# Patient Record
Sex: FEMALE | Race: WHITE | Employment: UNEMPLOYED | URBAN - METROPOLITAN AREA
[De-identification: names, ages, dates, MRNs, and addresses within clinical notes are randomized per-mention and may not be internally consistent; named-entity substitution may affect disease eponyms.]

---

## 2017-01-30 ENCOUNTER — HOSPITAL ENCOUNTER (OUTPATIENT)
Dept: PHYSICAL THERAPY | Age: 37
Discharge: HOME | End: 2017-02-28
Admitting: FAMILY MEDICINE

## 2017-02-15 ENCOUNTER — HOSPITAL ENCOUNTER (OUTPATIENT)
Dept: OTHER | Age: 37
Discharge: HOME | End: 2017-02-15
Attending: FAMILY MEDICINE | Admitting: FAMILY MEDICINE

## 2017-08-26 ENCOUNTER — HOSPITAL ENCOUNTER (OUTPATIENT)
Dept: ULTRASOUND IMAGING | Age: 37
Discharge: HOME | End: 2017-08-26
Attending: FAMILY MEDICINE | Admitting: FAMILY MEDICINE

## 2017-08-26 DIAGNOSIS — R10.11 RUQ PAIN: ICD-10-CM

## 2017-08-26 DIAGNOSIS — R10.11 RIGHT UPPER QUADRANT PAIN: ICD-10-CM

## 2018-08-13 ENCOUNTER — OFFICE VISIT (OUTPATIENT)
Dept: SURGERY | Age: 38
End: 2018-08-13

## 2018-08-13 VITALS
BODY MASS INDEX: 46.78 KG/M2 | HEART RATE: 104 BPM | WEIGHT: 274 LBS | HEIGHT: 64 IN | DIASTOLIC BLOOD PRESSURE: 87 MMHG | SYSTOLIC BLOOD PRESSURE: 139 MMHG

## 2018-08-13 DIAGNOSIS — I10 ESSENTIAL HYPERTENSION: ICD-10-CM

## 2018-08-13 DIAGNOSIS — I65.22 OCCLUSION OF LEFT CAROTID ARTERY: Primary | ICD-10-CM

## 2018-08-13 DIAGNOSIS — Z82.49 FAMILY HISTORY OF ATHEROSCLEROSIS: ICD-10-CM

## 2018-08-13 DIAGNOSIS — E66.01 MORBID OBESITY DUE TO EXCESS CALORIES (HCC): ICD-10-CM

## 2018-08-13 DIAGNOSIS — I65.01 ASYMPTOMATIC STENOSIS OF RIGHT VERTEBRAL ARTERY: ICD-10-CM

## 2018-08-13 DIAGNOSIS — Z72.0 TOBACCO USE: ICD-10-CM

## 2018-08-13 DIAGNOSIS — Z86.73 HISTORY OF TIAS: ICD-10-CM

## 2018-08-13 DIAGNOSIS — E78.5 HYPERLIPIDEMIA, UNSPECIFIED HYPERLIPIDEMIA TYPE: ICD-10-CM

## 2018-08-13 PROCEDURE — 4004F PT TOBACCO SCREEN RCVD TLK: CPT | Performed by: SURGERY

## 2018-08-13 PROCEDURE — G8427 DOCREV CUR MEDS BY ELIG CLIN: HCPCS | Performed by: SURGERY

## 2018-08-13 PROCEDURE — G8599 NO ASA/ANTIPLAT THER USE RNG: HCPCS | Performed by: SURGERY

## 2018-08-13 PROCEDURE — 99214 OFFICE O/P EST MOD 30 MIN: CPT | Performed by: SURGERY

## 2018-08-13 PROCEDURE — G8417 CALC BMI ABV UP PARAM F/U: HCPCS | Performed by: SURGERY

## 2018-08-13 RX ORDER — BUSPIRONE HYDROCHLORIDE 15 MG/1
15 TABLET ORAL NIGHTLY
COMMUNITY

## 2018-08-13 RX ORDER — LOSARTAN POTASSIUM 100 MG/1
100 TABLET ORAL DAILY
COMMUNITY
End: 2019-10-23 | Stop reason: CLARIF

## 2018-08-13 RX ORDER — BUPROPION HYDROCHLORIDE 150 MG/1
150 TABLET, EXTENDED RELEASE ORAL DAILY
COMMUNITY
End: 2021-03-22 | Stop reason: CLARIF

## 2018-08-13 RX ORDER — CYCLOBENZAPRINE HCL 5 MG
5 TABLET ORAL 3 TIMES DAILY PRN
COMMUNITY
End: 2020-07-09 | Stop reason: CLARIF

## 2018-08-13 ASSESSMENT — ENCOUNTER SYMPTOMS
COUGH: 0
SHORTNESS OF BREATH: 1

## 2018-08-13 NOTE — PROGRESS NOTES
side, and 2+ on the left side. Radial pulses are 2+ on the right side, and 2+ on the left side. Femoral pulses are 2+ on the right side, and 2+ on the left side. Popliteal pulses are 2+ on the right side, and 2+ on the left side. Dorsalis pedis pulses are 2+ on the right side, and 2+ on the left side. Posterior tibial pulses are 2+ on the right side, and 2+ on the left side. Pulmonary/Chest: No respiratory distress. She has no decreased breath sounds. She has no wheezes. She has no rales. She exhibits no tenderness. Abdominal: Soft. Normal appearance and bowel sounds are normal. There is no hepatosplenomegaly. There is no tenderness. There is no rebound. No hernia. Hernia confirmed negative in the right inguinal area and confirmed negative in the left inguinal area. Musculoskeletal: Normal range of motion. Right knee: She exhibits no swelling and no deformity. No tenderness found. Left knee: She exhibits no swelling and no deformity. No tenderness found. Lumbar back: Normal. She exhibits no bony tenderness and no pain. Lymphadenopathy:        Right: No inguinal and no supraclavicular adenopathy present. Left: No inguinal and no supraclavicular adenopathy present. Neurological: She is alert and oriented to person, place, and time. She has normal strength. No cranial nerve deficit or sensory deficit. Coordination and gait normal.   Normal neurologic exam  Resolution of clumsy right hand from stroke 2 years ago. Skin: Skin is warm and intact. No bruising and no ecchymosis noted. No cyanosis. Psychiatric: She has a normal mood and affect. Her speech is normal and behavior is normal. Judgment and thought content normal. Cognition and memory are normal.   Nursing note and vitals reviewed. Assessment:       Diagnosis Orders   1. Occlusion of left carotid artery  VL Carotid Bilateral   2.  Asymptomatic stenosis of right vertebral artery  VL Carotid Bilateral   3. Tobacco use  VL Carotid Bilateral   4. Hyperlipidemia, unspecified hyperlipidemia type  VL Carotid Bilateral   5. Essential hypertension  VL Carotid Bilateral   6. Family history of atherosclerosis     7. Morbid obesity due to excess calories ([address]     8.  History of TIAs      12/2015           Plan:      STOP SMOKING IS ESSENTIAL FOR ARTERIAL HEALTH AND PROTECTION (Long discussion about why she must stop smoking!!!!)  Exercise for weight control and cardiovascular health  Carotid artery scan to monitor the status of the right carotid artery  Follow up visit in ~3-4 weeks          Quan Emmanuel MD

## 2018-08-20 ENCOUNTER — HOSPITAL ENCOUNTER (OUTPATIENT)
Dept: NON INVASIVE DIAGNOSTICS | Age: 38
Discharge: HOME | End: 2018-08-20
Attending: NURSE PRACTITIONER | Admitting: NURSE PRACTITIONER

## 2018-08-20 DIAGNOSIS — M54.5 LOW BACK PAIN, UNSPECIFIED BACK PAIN LATERALITY, UNSPECIFIED CHRONICITY, WITH SCIATICA PRESENCE UNSPECIFIED: ICD-10-CM

## 2018-10-08 ENCOUNTER — OFFICE VISIT (OUTPATIENT)
Dept: SURGERY | Age: 38
End: 2018-10-08
Payer: COMMERCIAL

## 2018-10-08 ENCOUNTER — PROCEDURE VISIT (OUTPATIENT)
Dept: SURGERY | Age: 38
End: 2018-10-08
Payer: COMMERCIAL

## 2018-10-08 VITALS — DIASTOLIC BLOOD PRESSURE: 80 MMHG | BODY MASS INDEX: 46.17 KG/M2 | SYSTOLIC BLOOD PRESSURE: 160 MMHG | WEIGHT: 269 LBS

## 2018-10-08 DIAGNOSIS — E78.5 HYPERLIPIDEMIA, UNSPECIFIED HYPERLIPIDEMIA TYPE: ICD-10-CM

## 2018-10-08 DIAGNOSIS — I65.22 OCCLUSION OF LEFT CAROTID ARTERY: ICD-10-CM

## 2018-10-08 DIAGNOSIS — Z72.0 TOBACCO USE: ICD-10-CM

## 2018-10-08 DIAGNOSIS — I65.01 ASYMPTOMATIC STENOSIS OF RIGHT VERTEBRAL ARTERY: ICD-10-CM

## 2018-10-08 DIAGNOSIS — I65.22 OCCLUSION OF LEFT CAROTID ARTERY: Primary | ICD-10-CM

## 2018-10-08 DIAGNOSIS — I10 ESSENTIAL HYPERTENSION: ICD-10-CM

## 2018-10-08 DIAGNOSIS — E66.01 MORBID OBESITY DUE TO EXCESS CALORIES (HCC): ICD-10-CM

## 2018-10-08 PROCEDURE — G8427 DOCREV CUR MEDS BY ELIG CLIN: HCPCS | Performed by: SURGERY

## 2018-10-08 PROCEDURE — 93880 EXTRACRANIAL BILAT STUDY: CPT | Performed by: SURGERY

## 2018-10-08 PROCEDURE — G8417 CALC BMI ABV UP PARAM F/U: HCPCS | Performed by: SURGERY

## 2018-10-08 PROCEDURE — G8484 FLU IMMUNIZE NO ADMIN: HCPCS | Performed by: SURGERY

## 2018-10-08 PROCEDURE — G8599 NO ASA/ANTIPLAT THER USE RNG: HCPCS | Performed by: SURGERY

## 2018-10-08 PROCEDURE — 4004F PT TOBACCO SCREEN RCVD TLK: CPT | Performed by: SURGERY

## 2018-10-08 PROCEDURE — 99213 OFFICE O/P EST LOW 20 MIN: CPT | Performed by: SURGERY

## 2018-10-08 NOTE — PATIENT INSTRUCTIONS
There is no worsening to the carotid arteries when compared to studies done in 2015  IT IS IMPERATIVE TO STOP SMOKING  CONTROL BLOOD PRESSURE. Weight loss would help immensely.   Follow up visit in 6 months for another carotid artery scan

## 2018-10-08 NOTE — PROGRESS NOTES
found. Left knee: She exhibits no swelling and no deformity. No tenderness found. Lumbar back: Normal. She exhibits no bony tenderness and no pain. Lymphadenopathy:        Right: No inguinal and no supraclavicular adenopathy present. Left: No inguinal and no supraclavicular adenopathy present. Neurological: She is alert and oriented to person, place, and time. She has normal strength. No cranial nerve deficit or sensory deficit. Coordination and gait normal.   Normal neurologic exam  Resolution of clumsy right hand from stroke 2 years ago. Skin: Skin is warm and intact. No bruising and no ecchymosis noted. No cyanosis. Psychiatric: She has a normal mood and affect. Her speech is normal and behavior is normal. Judgment and thought content normal. Cognition and memory are normal.   Nursing note and vitals reviewed. Assessment:       Diagnosis Orders   1. Occlusion of left carotid artery     2. Asymptomatic stenosis of right vertebral artery     3. Tobacco use     4. Hyperlipidemia, unspecified hyperlipidemia type     5. Essential hypertension     6. Morbid obesity due to excess calories ([address]             Plan:      There is no worsening to the carotid arteries when compared to studies done in 2015  IT IS IMPERATIVE TO STOP SMOKING  CONTROL BLOOD PRESSURE. Weight loss would help immensely.   Follow up visit in 6 months for another carotid artery scan        Juany Elise MD

## 2019-04-15 ENCOUNTER — PROCEDURE VISIT (OUTPATIENT)
Dept: SURGERY | Age: 39
End: 2019-04-15
Payer: COMMERCIAL

## 2019-04-15 ENCOUNTER — OFFICE VISIT (OUTPATIENT)
Dept: SURGERY | Age: 39
End: 2019-04-15
Payer: COMMERCIAL

## 2019-04-15 VITALS
BODY MASS INDEX: 46.17 KG/M2 | SYSTOLIC BLOOD PRESSURE: 147 MMHG | DIASTOLIC BLOOD PRESSURE: 85 MMHG | HEART RATE: 90 BPM | WEIGHT: 269 LBS

## 2019-04-15 DIAGNOSIS — E78.5 HYPERLIPIDEMIA, UNSPECIFIED HYPERLIPIDEMIA TYPE: ICD-10-CM

## 2019-04-15 DIAGNOSIS — I10 ESSENTIAL HYPERTENSION: ICD-10-CM

## 2019-04-15 DIAGNOSIS — I65.01 ASYMPTOMATIC STENOSIS OF RIGHT VERTEBRAL ARTERY: ICD-10-CM

## 2019-04-15 DIAGNOSIS — I65.22 OCCLUSION OF LEFT CAROTID ARTERY: Primary | ICD-10-CM

## 2019-04-15 DIAGNOSIS — E66.01 MORBID OBESITY DUE TO EXCESS CALORIES (HCC): ICD-10-CM

## 2019-04-15 DIAGNOSIS — Z86.73 H/O: CVA (CEREBROVASCULAR ACCIDENT): ICD-10-CM

## 2019-04-15 DIAGNOSIS — Z72.0 TOBACCO USE: ICD-10-CM

## 2019-04-15 PROCEDURE — 99214 OFFICE O/P EST MOD 30 MIN: CPT | Performed by: NURSE PRACTITIONER

## 2019-04-15 PROCEDURE — G8427 DOCREV CUR MEDS BY ELIG CLIN: HCPCS | Performed by: NURSE PRACTITIONER

## 2019-04-15 PROCEDURE — G8599 NO ASA/ANTIPLAT THER USE RNG: HCPCS | Performed by: NURSE PRACTITIONER

## 2019-04-15 PROCEDURE — 4004F PT TOBACCO SCREEN RCVD TLK: CPT | Performed by: NURSE PRACTITIONER

## 2019-04-15 PROCEDURE — 93880 EXTRACRANIAL BILAT STUDY: CPT | Performed by: SURGERY

## 2019-04-15 PROCEDURE — G8417 CALC BMI ABV UP PARAM F/U: HCPCS | Performed by: NURSE PRACTITIONER

## 2019-04-15 NOTE — LETTER
[facility]  [address] [place] [id number]  Phone: [phone]  Fax: [phone]    ERNA Anderson CNP        April 15, 2019      ERNA Nguyen CNP   [facility] [id number]     Patient: Sera Miles    MR Number: [id number]    YOB: 1980    Date of Visit: 4/15/2019        Dear Lee Lawson:    Dr. Kristen Caceres patient, was in the office today following her carotid duplex scan. My assessment is as follows:    Carotid artery stenosis, w/o mention of cerebral infarction  Current plans       * The patient has a 1-15% ANISHA stenosis by velocity criteria. * The patient's LICA is totally occluded. * The patient has not experienced any symptoms related to her carotid disease. * Follow up in 6 months with carotid doppler scan and office visit. I will keep you posted regarding her progress.     Sincerely,        ERNA Anderson CNP

## 2019-04-15 NOTE — PROGRESS NOTES
Subjective:      Patient ID: Warren Hernandez is a 45 y.o. female. Chief Complaint   Patient presents with    Carotid Disease     patiet is here for 6 month f/u on carotid disease with CDS and office visit. Pain Assessment  Mouna Fuller has a pain level on 0/10 scale:  7  Location:  Bilateral behind knees  Description:  exhausting  Radiation:   No  Duration:  7 month(s)  Time:  intermittent    HPI      The patient is a 45 y.o. female who presents with a complaint of carotid occlusion. The patient has been previously diagnosed with Left carotid artery occlusion. Date last seen: 10/8/18. Patient denies numbness/weakness on any one side, speech disturbances or visual disturbances. Since last visit patient has had left CDS to be review today and right CDS to be reviewed today. The patient has not previously undergone surgical intervention for this problem. She has a known left ICA occlusion with hx of CVA in 2016. She continues to smoke 1 PPD of cigarettes. Review of Systems   Eyes: Negative for visual disturbance. Neurological: Positive for headaches (since the CVA, she gets an instant HA whenever she gets excited). Negative for speech difficulty, weakness and numbness. All other systems reviewed and are negative. Allergies   Allergen Reactions    Morphine Nausea Only       Prior to Visit Medications    Medication Sig Taking?  Authorizing Provider   cyclobenzaprine (FLEXERIL) 5 MG tablet Take 5 mg by mouth 3 times daily as needed for Muscle spasms Yes Historical Provider, MD   buPROPion (WELLBUTRIN SR) 150 MG extended release tablet Take 150 mg by mouth daily Yes Historical Provider, MD   losartan (COZAAR) 100 MG tablet Take 100 mg by mouth daily Yes Historical Provider, MD   busPIRone (BUSPAR) 15 MG tablet Take 15 mg by mouth 2 times daily Yes Historical Provider, MD   aspirin 162 MG EC tablet Take 1 tablet by mouth daily  Patient taking differently: Take 81 mg by mouth daily  Yes Beltran New Pietro Guerra MD   atorvastatin (LIPITOR) 80 MG tablet Take 1 tablet by mouth nightly Yes Daniela Ahn MD   amLODIPine (NORVASC) 5 MG tablet Take 10 mg by mouth daily  Yes Historical Provider, MD     History reviewed. Objective:   Physical Exam   Constitutional: She is oriented to person, place, and time. She appears well-developed and well-nourished. Morbid obesity   HENT:   Head: Normocephalic. Right Ear: Hearing and external ear normal.   Left Ear: Hearing and external ear normal.   Neck: Normal range of motion. Neck supple. Carotid bruit is not present. Cardiovascular: Normal rate, regular rhythm and normal heart sounds. Pulses:       Carotid pulses are 2+ on the right side, and 2+ on the left side. Radial pulses are 2+ on the right side, and 2+ on the left side. Femoral pulses are 2+ on the right side, and 2+ on the left side. Popliteal pulses are 2+ on the right side, and 2+ on the left side. Dorsalis pedis pulses are 2+ on the right side, and 2+ on the left side. Posterior tibial pulses are 2+ on the right side, and 2+ on the left side. Pulmonary/Chest: No respiratory distress. She has no decreased breath sounds. She has no wheezes. She has no rales. She exhibits no tenderness. Abdominal: Soft. Normal appearance and bowel sounds are normal. There is no hepatosplenomegaly. There is no tenderness. There is no rebound. No hernia. Hernia confirmed negative in the right inguinal area and confirmed negative in the left inguinal area. Musculoskeletal: Normal range of motion. Right knee: She exhibits no swelling and no deformity. No tenderness found. Left knee: She exhibits no swelling and no deformity. No tenderness found. Lumbar back: Normal. She exhibits no bony tenderness and no pain. Neurological: She is alert and oriented to person, place, and time. She has normal strength. No cranial nerve deficit or sensory deficit.  Coordination and this documentation as scribed by the Medical Assistant Dean Desouza in my presence and it is both accurate and complete.

## 2019-06-12 ENCOUNTER — TELEPHONE (OUTPATIENT)
Dept: VASCULAR SURGERY | Age: 39
End: 2019-06-12

## 2019-06-12 DIAGNOSIS — I65.01 ASYMPTOMATIC STENOSIS OF RIGHT VERTEBRAL ARTERY: Primary | ICD-10-CM

## 2019-06-12 NOTE — TELEPHONE ENCOUNTER
Call received from NP  about patient having 2 at home + pregnancy tests. Patient on asa and statin therapy for carotid stenosis. Ok to stop statin therapy during pregnancy, recommend to resume post partum.   instructed to discuss weight reduction and tobacco cessation    ERNA Villalba - CNP

## 2019-10-23 ENCOUNTER — OFFICE VISIT (OUTPATIENT)
Dept: SURGERY | Age: 39
End: 2019-10-23
Payer: COMMERCIAL

## 2019-10-23 ENCOUNTER — PROCEDURE VISIT (OUTPATIENT)
Dept: SURGERY | Age: 39
End: 2019-10-23
Payer: COMMERCIAL

## 2019-10-23 VITALS
HEART RATE: 86 BPM | SYSTOLIC BLOOD PRESSURE: 128 MMHG | DIASTOLIC BLOOD PRESSURE: 72 MMHG | WEIGHT: 273 LBS | BODY MASS INDEX: 46.86 KG/M2

## 2019-10-23 DIAGNOSIS — E66.01 MORBID OBESITY DUE TO EXCESS CALORIES (HCC): ICD-10-CM

## 2019-10-23 DIAGNOSIS — I65.01 ASYMPTOMATIC STENOSIS OF RIGHT VERTEBRAL ARTERY: ICD-10-CM

## 2019-10-23 DIAGNOSIS — E78.5 HYPERLIPIDEMIA, UNSPECIFIED HYPERLIPIDEMIA TYPE: ICD-10-CM

## 2019-10-23 DIAGNOSIS — Z72.0 TOBACCO USE: ICD-10-CM

## 2019-10-23 DIAGNOSIS — I65.22 OCCLUSION OF LEFT CAROTID ARTERY: Primary | ICD-10-CM

## 2019-10-23 DIAGNOSIS — I10 ESSENTIAL HYPERTENSION: ICD-10-CM

## 2019-10-23 PROCEDURE — G8599 NO ASA/ANTIPLAT THER USE RNG: HCPCS | Performed by: NURSE PRACTITIONER

## 2019-10-23 PROCEDURE — G8427 DOCREV CUR MEDS BY ELIG CLIN: HCPCS | Performed by: NURSE PRACTITIONER

## 2019-10-23 PROCEDURE — G8417 CALC BMI ABV UP PARAM F/U: HCPCS | Performed by: NURSE PRACTITIONER

## 2019-10-23 PROCEDURE — 4004F PT TOBACCO SCREEN RCVD TLK: CPT | Performed by: NURSE PRACTITIONER

## 2019-10-23 PROCEDURE — 99214 OFFICE O/P EST MOD 30 MIN: CPT | Performed by: NURSE PRACTITIONER

## 2019-10-23 PROCEDURE — 93880 EXTRACRANIAL BILAT STUDY: CPT | Performed by: SURGERY

## 2019-10-23 PROCEDURE — G8484 FLU IMMUNIZE NO ADMIN: HCPCS | Performed by: NURSE PRACTITIONER

## 2019-10-23 RX ORDER — LABETALOL 100 MG/1
100 TABLET, FILM COATED ORAL 2 TIMES DAILY
COMMUNITY
End: 2021-05-28

## 2020-07-09 ENCOUNTER — OFFICE VISIT (OUTPATIENT)
Dept: SURGERY | Age: 40
End: 2020-07-09
Payer: COMMERCIAL

## 2020-07-09 ENCOUNTER — PROCEDURE VISIT (OUTPATIENT)
Dept: SURGERY | Age: 40
End: 2020-07-09
Payer: COMMERCIAL

## 2020-07-09 VITALS — WEIGHT: 288 LBS | BODY MASS INDEX: 49.44 KG/M2 | DIASTOLIC BLOOD PRESSURE: 86 MMHG | SYSTOLIC BLOOD PRESSURE: 160 MMHG

## 2020-07-09 PROCEDURE — 99214 OFFICE O/P EST MOD 30 MIN: CPT | Performed by: NURSE PRACTITIONER

## 2020-07-09 PROCEDURE — G8417 CALC BMI ABV UP PARAM F/U: HCPCS | Performed by: NURSE PRACTITIONER

## 2020-07-09 PROCEDURE — 93880 EXTRACRANIAL BILAT STUDY: CPT | Performed by: SURGERY

## 2020-07-09 PROCEDURE — 1036F TOBACCO NON-USER: CPT | Performed by: NURSE PRACTITIONER

## 2020-07-09 PROCEDURE — 93922 UPR/L XTREMITY ART 2 LEVELS: CPT | Performed by: NURSE PRACTITIONER

## 2020-07-09 PROCEDURE — G8427 DOCREV CUR MEDS BY ELIG CLIN: HCPCS | Performed by: NURSE PRACTITIONER

## 2020-07-09 RX ORDER — ATORVASTATIN CALCIUM 80 MG/1
80 TABLET, FILM COATED ORAL DAILY
COMMUNITY
Start: 2020-01-22

## 2020-07-09 RX ORDER — LOSARTAN POTASSIUM 100 MG/1
50 TABLET ORAL 2 TIMES DAILY
COMMUNITY
Start: 2020-01-15 | End: 2021-03-22 | Stop reason: CLARIF

## 2020-07-09 NOTE — PATIENT INSTRUCTIONS
Return in about 6 months (around 1/9/2021) for CDS & Office Visit. PATIENT EDUCATION focused on signs/symptoms of stroke:  - Watch for one eye going dark like a shade was pulled down. - Watch for one side of the body or face not functioning correctly. - Difficulty with speech, such as slurring your words. - Difficulty finding the right words, such as calling an object by the wrong name when you know the real name. If you have any of these symptoms, do not call us or your family doctor. Call 911 and go immediately to the hospital.  You have a 4-6 hour window from the point when symptoms start to get to the hospital, get a CT scan done and initiate clot dissolving drugs. PATIENT EDUCATION focused on elevating legs with the ankle at or above the level of the heart as needed to relieve leg pain and swelling. Patient to participate in exercise as tolerated with focus on the leg(s) including, daily walking, repetitive toe pointing, and calve muscle pumping/stretching as tolerated. PATIENT EDUCATION focused on the need for compression stockings. They are specially designed hosiery that improve blood flow in the legs, prevent blood clotting and inhibit the progression of a variety of venous disorders. They are designed to be tighter around the ankles with gradually less pressure moving up the lower limbs toward the knees. Working with the calf muscle, the stocking is designed to help squeeze the venous blood back up toward the heart, to enhance circulation.

## 2020-07-09 NOTE — LETTER
[facility] Vascular Surgery  [address] [place], [place] [address] [address]  Phone: [phone]  Fax: [phone]    ERNA Wills CNP        July 9, 2020    ERNA Wheeler CNP   [place] [address]     Patient: Kristen Amador    MR Number: [id number]    YOB: 1980    Date of Visit: 7/9/2020       Dear Po Hernandez:    Danw Rizvi, Dr. Bailey Kimble patient, was in the office today following her carotid duplex scan. My assessment is as follows:    Carotid artery stenosis, w/o mention of cerebral infarction  Current plans       * The patient has a 1-15% ANISHA stenosis by velocity criteria. * The patient's LICA is totally occluded. * The patient has not experienced any symptoms related to her carotid disease. * Follow up in 6 months with carotid doppler scan and office visit. I will keep you posted regarding her progress.     Sincerely,        ERNA Wills CNP

## 2020-07-09 NOTE — PROGRESS NOTES
Subjective:      Patient ID: Shankar Alcantar is a 44 y.o. female. Chief Complaint   Patient presents with    Carotid Disease     patiet is here for 6 month f/u on carotid disease with CDS and office visit. Pain Assessment  The patient is currently not experiencing any pain at this time. HPI      The patient is a 44 y.o. female who presents with a complaint of carotid occlusion. The patient has been previously diagnosed with Left carotid artery occlusion. Date last seen: 10/23/19. Patient denies numbness/weakness on any one side, speech disturbances or visual disturbances. Since last visit patient has had left CDS to be review today and right CDS to be reviewed today. The patient has not previously undergone surgical intervention for this problem. She has a known left ICA occlusion with hx of CVA in 2016. She quit smoking 7 months ago. Mouna is a 44 y.o. female who presents with a complaint of intermittent claudication. The problem is located in the bilateral lower extremities, R>L. The symptoms first began a month ago. The patient reports that she is unable to do grocery shopping without taking breaks. The patient has had no prior relevant surgeries. Edema  The edema is located in the bilateral lower extremities. The patient states the edema is recurrent. The patient has not undergone any new diagnostic testing since last visit. Treatment so far includes: none. Review of Systems   Constitutional: Negative for chills and fever. Eyes: Negative for visual disturbance. Cardiovascular: Positive for leg swelling. Leg pain with walking   Skin: Negative for color change and wound. Neurological: Negative for speech difficulty, weakness and numbness. All other systems reviewed and are negative. Allergies   Allergen Reactions    Morphine Nausea Only       Prior to Visit Medications    Medication Sig Taking?  Authorizing Provider   labetalol (NORMODYNE) 100 MG tablet Take 100 mg by mouth 2 times daily Yes Historical Provider, MD   cyclobenzaprine (FLEXERIL) 5 MG tablet Take 5 mg by mouth 3 times daily as needed for Muscle spasms Yes Historical Provider, MD   buPROPion (WELLBUTRIN SR) 150 MG extended release tablet Take 150 mg by mouth daily Yes Historical Provider, MD   busPIRone (BUSPAR) 15 MG tablet Take 15 mg by mouth 2 times daily Yes Historical Provider, MD   aspirin 162 MG EC tablet Take 1 tablet by mouth daily  Patient taking differently: Take 81 mg by mouth daily  Yes Oral MD Garrett     History reviewed. Objective:   Physical Exam  Vitals signs and nursing note reviewed. Constitutional:       Appearance: Normal appearance. She is well-developed. Comments: Morbid obesity   HENT:      Head: Normocephalic. Right Ear: Hearing and external ear normal.      Left Ear: Hearing and external ear normal.   Neck:      Musculoskeletal: Normal range of motion and neck supple. Vascular: No carotid bruit. Cardiovascular:      Rate and Rhythm: Normal rate and regular rhythm. Pulses:           Carotid pulses are 2+ on the right side and 2+ on the left side. Radial pulses are 2+ on the right side and 2+ on the left side. Femoral pulses are 2+ on the right side and 2+ on the left side. Dorsalis pedis pulses are 1+ on the right side and 0 on the left side. Posterior tibial pulses are 1+ on the right side and 1+ on the left side. Heart sounds: Normal heart sounds. Comments:   XAVIER'S 7/9/2020  Right:  P.T.: 132 D.P.: 143 ARM BP: 176/71 P. I.: 0.75/0.81  Left:  P.T.: 130 D. P.: 108 ARM BP: 170/69 P. I.: 0.73/0.61  Pulmonary:      Effort: No respiratory distress. Breath sounds: No decreased breath sounds, wheezing or rales. Chest:      Chest wall: No tenderness. Musculoskeletal: Normal range of motion. Right knee: She exhibits no swelling and no deformity. No tenderness found.       Left knee: She exhibits no swelling and no deformity. No tenderness found. Lumbar back: Normal. She exhibits no bony tenderness and no pain. Right lower leg: Edema (nonpitting edema; right ankle measures 24 cm, calf 46.9 cm) present. Left lower leg: Edema (nonpitting edema; left ankle measures 24 cm, calf 46.5 cm) present. Skin:     General: Skin is warm and dry. Findings: No bruising or ecchymosis. Neurological:      Mental Status: She is alert and oriented to person, place, and time. Cranial Nerves: No cranial nerve deficit. Sensory: No sensory deficit. Coordination: Coordination normal.      Gait: Gait normal.   Psychiatric:         Speech: Speech normal.         Behavior: Behavior normal.         Thought Content: Thought content normal.         Judgment: Judgment normal.         Assessment:         Diagnosis   1. Occlusion of left carotid artery   The patient has 1-15% ANISHA stenosis by velocity criteria. The patient's LICA is totally occluded. The patient has not experienced any new symptoms related to her carotid disease. Follow up in 6 months with a carotid doppler scan and office visit. 2. Intermittent claudication of both lower extremities due to atherosclerosis ([address]   3. Hyperlipidemia, unspecified hyperlipidemia type - on Lipitor   4. Leg swelling - prescription for compression stockings, 20-30 mmHg   5. Essential hypertension - stable, on labetalol and losartan       XAVIER's were done today. Prescription given for 20-30mmHg Compression Stockings. PATIENT EDUCATION focused on signs/symptoms of stroke:  - Watch for one eye going dark like a shade was pulled down. - Watch for one side of the body or face not functioning correctly. - Difficulty with speech, such as slurring your words. - Difficulty finding the right words, such as calling an object by the wrong name when you know the real name. If you have any of these symptoms, do not call us or your family doctor.   Call 911 and go immediately to the hospital.  You have a 4-6 hour window from the point when symptoms start to get to the hospital, get a CT scan done and initiate clot dissolving drugs. PATIENT EDUCATION focused on elevating legs with the ankle at or above the level of the heart as needed to relieve leg pain and swelling. Patient to participate in exercise as tolerated with focus on the leg(s) including, daily walking, repetitive toe pointing, and calve muscle pumping/stretching as tolerated. PATIENT EDUCATION focused on the need for compression stockings. They are specially designed hosiery that improve blood flow in the legs, prevent blood clotting and inhibit the progression of a variety of venous disorders. They are designed to be tighter around the ankles with gradually less pressure moving up the lower limbs toward the knees. Working with the calf muscle, the stocking is designed to help squeeze the venous blood back up toward the heart, to enhance circulation. Plan:        Return for Bilateral LE arterial study on 8/4/2020; will call results. Return in about 6 months (around 1/9/2021) for CDS, XAVIER's & Office Visit. Jana Connell MA, am scribing for and in the presence of Tate Mosqueda CNP on this date of 07/09/20 at 10:22 AM     I Tate Mosqueda CNP, personally performed the services described in this documentation as scribed by the Medical Assistant Godwin Ennis in my presence and it is both accurate and complete.

## 2020-07-10 PROBLEM — M79.89 LEG SWELLING: Status: ACTIVE | Noted: 2020-07-10

## 2020-07-10 PROBLEM — I70.213 INTERMITTENT CLAUDICATION OF BOTH LOWER EXTREMITIES DUE TO ATHEROSCLEROSIS (HCC): Status: ACTIVE | Noted: 2020-07-10

## 2020-07-10 ASSESSMENT — ENCOUNTER SYMPTOMS: COLOR CHANGE: 0

## 2020-07-29 ENCOUNTER — TELEPHONE (OUTPATIENT)
Dept: SURGERY | Age: 40
End: 2020-07-29

## 2020-07-29 NOTE — TELEPHONE ENCOUNTER
Patient called pcp because her legs were swelling and pcp said to let vascular office know so she wanted a message to go to [place] about it

## 2020-07-29 NOTE — TELEPHONE ENCOUNTER
Called and discussed compression options with patient. ACE wraps, stockings and Unna Boots. Patient has not been compliant and does not want her legs wrapped. She will call back if she changes her mind.

## 2020-07-31 ENCOUNTER — TELEPHONE (OUTPATIENT)
Dept: SURGERY | Age: 40
End: 2020-07-31

## 2020-07-31 NOTE — TELEPHONE ENCOUNTER
Pt will need to call her PCP for the results. She had been to [facility] for an ultrasound.

## 2021-03-22 ENCOUNTER — OFFICE VISIT (OUTPATIENT)
Dept: SURGERY | Age: 41
End: 2021-03-22
Payer: COMMERCIAL

## 2021-03-22 ENCOUNTER — PROCEDURE VISIT (OUTPATIENT)
Dept: SURGERY | Age: 41
End: 2021-03-22
Payer: COMMERCIAL

## 2021-03-22 VITALS — DIASTOLIC BLOOD PRESSURE: 80 MMHG | SYSTOLIC BLOOD PRESSURE: 142 MMHG | WEIGHT: 272.4 LBS | BODY MASS INDEX: 46.76 KG/M2

## 2021-03-22 DIAGNOSIS — I10 ESSENTIAL HYPERTENSION: ICD-10-CM

## 2021-03-22 DIAGNOSIS — I70.213 INTERMITTENT CLAUDICATION OF BOTH LOWER EXTREMITIES DUE TO ATHEROSCLEROSIS (HCC): ICD-10-CM

## 2021-03-22 DIAGNOSIS — M79.89 LEG SWELLING: ICD-10-CM

## 2021-03-22 DIAGNOSIS — I65.22 OCCLUSION OF LEFT CAROTID ARTERY: Primary | ICD-10-CM

## 2021-03-22 DIAGNOSIS — I65.23 BILATERAL CAROTID ARTERY STENOSIS: Primary | ICD-10-CM

## 2021-03-22 DIAGNOSIS — E78.5 HYPERLIPIDEMIA, UNSPECIFIED HYPERLIPIDEMIA TYPE: ICD-10-CM

## 2021-03-22 PROCEDURE — G8417 CALC BMI ABV UP PARAM F/U: HCPCS | Performed by: NURSE PRACTITIONER

## 2021-03-22 PROCEDURE — 93922 UPR/L XTREMITY ART 2 LEVELS: CPT | Performed by: NURSE PRACTITIONER

## 2021-03-22 PROCEDURE — 99214 OFFICE O/P EST MOD 30 MIN: CPT | Performed by: NURSE PRACTITIONER

## 2021-03-22 PROCEDURE — G8427 DOCREV CUR MEDS BY ELIG CLIN: HCPCS | Performed by: NURSE PRACTITIONER

## 2021-03-22 PROCEDURE — 93880 EXTRACRANIAL BILAT STUDY: CPT | Performed by: SURGERY

## 2021-03-22 PROCEDURE — G8484 FLU IMMUNIZE NO ADMIN: HCPCS | Performed by: NURSE PRACTITIONER

## 2021-03-22 PROCEDURE — 1036F TOBACCO NON-USER: CPT | Performed by: NURSE PRACTITIONER

## 2021-03-22 RX ORDER — CYCLOBENZAPRINE HCL 5 MG
1 TABLET ORAL 3 TIMES DAILY PRN
COMMUNITY
End: 2021-05-28

## 2021-03-22 RX ORDER — IBUPROFEN 600 MG/1
TABLET ORAL
COMMUNITY
Start: 2021-01-14 | End: 2021-05-28

## 2021-03-22 RX ORDER — ASPIRIN 81 MG/1
162 TABLET, COATED ORAL DAILY
Status: ON HOLD | COMMUNITY
Start: 2021-01-20 | End: 2021-05-29 | Stop reason: HOSPADM

## 2021-03-22 RX ORDER — LOSARTAN POTASSIUM 50 MG/1
50 TABLET ORAL DAILY
COMMUNITY
Start: 2021-02-19

## 2021-03-22 RX ORDER — BUPROPION HYDROCHLORIDE 150 MG/1
150 TABLET ORAL EVERY MORNING
COMMUNITY
Start: 2021-02-19 | End: 2021-10-07 | Stop reason: ALTCHOICE

## 2021-03-22 ASSESSMENT — ENCOUNTER SYMPTOMS: COLOR CHANGE: 0

## 2021-03-22 NOTE — PATIENT INSTRUCTIONS
Please schedule to return in six months for a repeat carotid duplex scan and office visit. PATIENT EDUCATION focused on signs/symptoms of stroke:  - Watch for one eye going dark like a shade was pulled down. - Watch for one side of the body or face not functioning correctly. - Difficulty with speech, such as slurring your words. - Difficulty finding the right words, such as calling an object by the wrong name when you know the real name. If you have any of these symptoms, do not call us or your family doctor. Call 911 and go immediately to the hospital.  You have a 4-6 hour window from the point when symptoms start to get to the hospital, get a CT scan done and initiate clot dissolving drugs.

## 2021-03-22 NOTE — PROGRESS NOTES
Subjective:      Patient ID: Lj Pang is a 36 y.o. female. Chief Complaint   Patient presents with    Carotid Disease     patiet is here for 6 month f/u on carotid disease with CDS and office visit. Pain Assessment  The patient is currently not experiencing any pain at this time. HPI      The patient is a 36 y.o. female who presents with a complaint of carotid occlusion. The patient has been previously diagnosed with Left carotid artery occlusion. Date last seen: 7/9/20. Patient denies numbness/weakness on any one side, speech disturbances or visual disturbances. Since last visit patient has had left CDS to be review today and right CDS to be reviewed today. The patient has not previously undergone surgical intervention for this problem. She has a known left ICA occlusion with hx of CVA in 2016. She quit smoking 7 months ago. Mouna is a 36 y.o. female who presents with a complaint of intermittent claudication. The problem is located in the bilateral lower extremities. Date last seen was 7/9/20. The symptoms first began year(s) ago. The patient has not experienced any new symptoms since last visit. The patient describes pain as mild to moderate in severity. There have been no new developments in patient's medical status. Since last visit the patient has had XAVIER's (today). The patient has had no prior relevant surgeries. Review of Systems   Constitutional: Negative for chills and fever. Eyes: Negative for visual disturbance. Cardiovascular: Positive for leg swelling. Leg pain with walking   Skin: Negative for color change and wound. Neurological: Negative for speech difficulty, weakness and numbness. All other systems reviewed and are negative. Allergies   Allergen Reactions    Morphine Nausea Only       Prior to Visit Medications    Medication Sig Taking?  Authorizing Provider   atorvastatin (LIPITOR) 80 MG tablet Take 80 mg by mouth daily Yes Historical Provider, MD   labetalol (NORMODYNE) 100 MG tablet Take 100 mg by mouth 2 times daily Yes Historical Provider, MD   busPIRone (BUSPAR) 15 MG tablet Take 15 mg by mouth 2 times daily Yes Historical Provider, MD   PRENATAL VIT-FE FUMARATE-FA PO Take 1 tablet by mouth daily  Historical Provider, MD   ASPIRIN LOW DOSE 81 MG EC tablet   Historical Provider, MD   buPROPion (WELLBUTRIN XL) 150 MG extended release tablet   Historical Provider, MD   cyclobenzaprine (FLEXERIL) 5 MG tablet Take 1 tablet by mouth 3 times daily as needed  Historical Provider, MD   ibuprofen (ADVIL;MOTRIN) 600 MG tablet   Historical Provider, MD   losartan (COZAAR) 50 MG tablet   Historical Provider, MD     History reviewed. Objective:   Physical Exam  Vitals signs and nursing note reviewed. Constitutional:       Appearance: Normal appearance. She is well-developed. Comments: Morbid obesity   HENT:      Head: Normocephalic. Right Ear: Hearing and external ear normal.      Left Ear: Hearing and external ear normal.   Neck:      Musculoskeletal: Normal range of motion and neck supple. Vascular: No carotid bruit. Cardiovascular:      Rate and Rhythm: Normal rate and regular rhythm. Pulses:           Carotid pulses are 2+ on the right side and 2+ on the left side. Radial pulses are 2+ on the right side and 2+ on the left side. Femoral pulses are 2+ on the right side and 2+ on the left side. Popliteal pulses are 2+ on the right side and 2+ on the left side. Dorsalis pedis pulses are 1+ on the right side and 0 on the left side. Posterior tibial pulses are 0 on the right side and 0 on the left side. Heart sounds: Normal heart sounds. Comments:   XAVIER'S 3/22/2021  Right:  P.T.: 110 D. P.: 126 ARM BP:            P.I.: 0.74/0.85  Left:  P.T.: 104 D.P.: 112 ARM BP: 148    P. I.: 0.70/0.76    XAVIER'S 7/9/2020  Right:  P.T.: 132 D.P.: 143 ARM BP: 176/71 P. I.: 0.75/0.81  Left:  P.T.: 130 D. P.: 108 ARM BP: 170/69 P. I.: 0.73/0.61  Pulmonary:      Effort: No respiratory distress. Breath sounds: No decreased breath sounds, wheezing or rales. Chest:      Chest wall: No tenderness. Musculoskeletal: Normal range of motion. Right knee: She exhibits no swelling and no deformity. No tenderness found. Left knee: She exhibits no swelling and no deformity. No tenderness found. Lumbar back: Normal. She exhibits no bony tenderness and no pain. Right lower leg: Edema (nonpitting edema; right ankle measures 23.7 cm, calf 46.4 cm) present. Left lower leg: Edema (nonpitting edema; left ankle measures 23.8 cm, calf 46.2 cm) present. Skin:     General: Skin is warm and dry. Findings: No bruising or ecchymosis. Neurological:      Mental Status: She is alert and oriented to person, place, and time. Cranial Nerves: No cranial nerve deficit. Sensory: No sensory deficit. Coordination: Coordination normal.      Gait: Gait normal.   Psychiatric:         Speech: Speech normal.         Behavior: Behavior normal.         Thought Content: Thought content normal.         Judgment: Judgment normal.         Assessment:       Diagnosis   1. Occlusion of left carotid artery   The patient has 1-15% ANISHA stenosis by velocity criteria. The patient's LICA is totally occluded. The patient has not experienced any new symptoms related to her carotid disease. Follow up in 6 months with a carotid doppler scan and office visit. 2. Intermittent claudication of both lower extremities due to atherosclerosis ([facility] [address] - XAVIER's   3. Hyperlipidemia, unspecified hyperlipidemia type - on Lipitor 80 mg   4. Essential hypertension - stable, on labetalol 100 mg and losartan 50 mg   5. Leg swelling          XAVIER's were done today. PATIENT EDUCATION focused on signs/symptoms of stroke:  - Watch for one eye going dark like a shade was pulled down.   - Watch for one side of the body or face not functioning correctly. - Difficulty with speech, such as slurring your words. - Difficulty finding the right words, such as calling an object by the wrong name when you know the real name. If you have any of these symptoms, do not call us or your family doctor. Call 911 and go immediately to the hospital.  You have a 4-6 hour window from the point when symptoms start to get to the hospital, get a CT scan done and initiate clot dissolving drugs. Plan:        Return in about 6 months (around 9/22/2021) for carotid duplex scan and office visit.

## 2021-03-22 NOTE — LETTER
[facility]  [address] [place] [id number]  Phone: [phone]  Fax: [phone]    ERNA Barrios CNP        March 22, 2021    Lamarr Boxer, APRN - CNP   [facility] [id number]     Patient: Renae Tabares    MR Number: [id number]    YOB: 1980    Date of Visit: 3/22/2021       Dear Lamarr Boxer:    Dr. Rhea Franklin Check patient, was in the office today following her carotid duplex scan. My assessment is as follows:    Carotid artery stenosis, w/o mention of cerebral infarction  Current plans       * The patient has a 1-15% ANISHA stenosis by velocity criteria. * The patient's LICA is totally occluded. * The patient has not experienced any symptoms related to her carotid disease. * Follow up in 6 months with carotid doppler scan and office visit. I will keep you posted regarding her progress.     Sincerely,        ERNA Barrios CNP

## 2021-05-28 ENCOUNTER — TELEPHONE (OUTPATIENT)
Dept: SURGERY | Age: 41
End: 2021-05-28

## 2021-05-28 ENCOUNTER — HOSPITAL ENCOUNTER (OUTPATIENT)
Age: 41
Setting detail: OBSERVATION
Discharge: HOME | End: 2021-05-29
Attending: INTERNAL MEDICINE | Admitting: INTERNAL MEDICINE
Payer: COMMERCIAL

## 2021-05-28 DIAGNOSIS — I10 HYPERTENSION, UNSPECIFIED TYPE: ICD-10-CM

## 2021-05-28 DIAGNOSIS — H53.8 BLURRED VISION: Primary | ICD-10-CM

## 2021-05-28 PROBLEM — H53.9 VISUAL DISTURBANCE: Status: ACTIVE | Noted: 2021-05-28

## 2021-05-28 PROCEDURE — 99283 EMERGENCY DEPT VISIT LOW MDM: CPT

## 2021-05-28 PROCEDURE — 6370000000 HC RX 637 (ALT 250 FOR IP): Performed by: NURSE PRACTITIONER

## 2021-05-28 PROCEDURE — 2580000003 HC RX 258: Performed by: NURSE PRACTITIONER

## 2021-05-28 PROCEDURE — G0378 HOSPITAL OBSERVATION PER HR: HCPCS

## 2021-05-28 PROCEDURE — 6370000000 HC RX 637 (ALT 250 FOR IP): Performed by: PHYSICIAN ASSISTANT

## 2021-05-28 RX ORDER — PROMETHAZINE HYDROCHLORIDE 25 MG/1
12.5 TABLET ORAL EVERY 6 HOURS PRN
Status: DISCONTINUED | OUTPATIENT
Start: 2021-05-28 | End: 2021-05-29 | Stop reason: HOSPADM

## 2021-05-28 RX ORDER — SODIUM CHLORIDE 9 MG/ML
25 INJECTION, SOLUTION INTRAVENOUS PRN
Status: DISCONTINUED | OUTPATIENT
Start: 2021-05-28 | End: 2021-05-29 | Stop reason: HOSPADM

## 2021-05-28 RX ORDER — ASPIRIN 81 MG/1
81 TABLET ORAL DAILY
Status: DISCONTINUED | OUTPATIENT
Start: 2021-05-29 | End: 2021-05-29 | Stop reason: HOSPADM

## 2021-05-28 RX ORDER — ACETAMINOPHEN 325 MG/1
650 TABLET ORAL EVERY 4 HOURS PRN
Status: DISCONTINUED | OUTPATIENT
Start: 2021-05-28 | End: 2021-05-29 | Stop reason: HOSPADM

## 2021-05-28 RX ORDER — LABETALOL HYDROCHLORIDE 5 MG/ML
10 INJECTION, SOLUTION INTRAVENOUS EVERY 10 MIN PRN
Status: DISCONTINUED | OUTPATIENT
Start: 2021-05-28 | End: 2021-05-29 | Stop reason: HOSPADM

## 2021-05-28 RX ORDER — ONDANSETRON 2 MG/ML
4 INJECTION INTRAMUSCULAR; INTRAVENOUS EVERY 6 HOURS PRN
Status: DISCONTINUED | OUTPATIENT
Start: 2021-05-28 | End: 2021-05-29 | Stop reason: HOSPADM

## 2021-05-28 RX ORDER — SODIUM CHLORIDE 0.9 % (FLUSH) 0.9 %
5-40 SYRINGE (ML) INJECTION PRN
Status: DISCONTINUED | OUTPATIENT
Start: 2021-05-28 | End: 2021-05-29 | Stop reason: HOSPADM

## 2021-05-28 RX ORDER — POLYETHYLENE GLYCOL 3350 17 G/17G
17 POWDER, FOR SOLUTION ORAL DAILY PRN
Status: DISCONTINUED | OUTPATIENT
Start: 2021-05-28 | End: 2021-05-29 | Stop reason: HOSPADM

## 2021-05-28 RX ORDER — BUSPIRONE HYDROCHLORIDE 15 MG/1
15 TABLET ORAL 2 TIMES DAILY
Status: DISCONTINUED | OUTPATIENT
Start: 2021-05-28 | End: 2021-05-29 | Stop reason: HOSPADM

## 2021-05-28 RX ORDER — LABETALOL 100 MG/1
100 TABLET, FILM COATED ORAL 2 TIMES DAILY
Status: DISCONTINUED | OUTPATIENT
Start: 2021-05-28 | End: 2021-05-28 | Stop reason: ALTCHOICE

## 2021-05-28 RX ORDER — ASPIRIN 81 MG/1
324 TABLET, CHEWABLE ORAL ONCE
Status: COMPLETED | OUTPATIENT
Start: 2021-05-28 | End: 2021-05-28

## 2021-05-28 RX ORDER — ATORVASTATIN CALCIUM 80 MG/1
80 TABLET, FILM COATED ORAL NIGHTLY
Status: DISCONTINUED | OUTPATIENT
Start: 2021-05-29 | End: 2021-05-29 | Stop reason: HOSPADM

## 2021-05-28 RX ORDER — LOSARTAN POTASSIUM 50 MG/1
50 TABLET ORAL DAILY
Status: DISCONTINUED | OUTPATIENT
Start: 2021-05-29 | End: 2021-05-29 | Stop reason: HOSPADM

## 2021-05-28 RX ORDER — CYCLOBENZAPRINE HCL 10 MG
5 TABLET ORAL 3 TIMES DAILY PRN
Status: DISCONTINUED | OUTPATIENT
Start: 2021-05-28 | End: 2021-05-28 | Stop reason: ALTCHOICE

## 2021-05-28 RX ORDER — SODIUM CHLORIDE 0.9 % (FLUSH) 0.9 %
5-40 SYRINGE (ML) INJECTION EVERY 12 HOURS SCHEDULED
Status: DISCONTINUED | OUTPATIENT
Start: 2021-05-28 | End: 2021-05-29 | Stop reason: HOSPADM

## 2021-05-28 RX ORDER — ASPIRIN 300 MG/1
300 SUPPOSITORY RECTAL DAILY
Status: DISCONTINUED | OUTPATIENT
Start: 2021-05-29 | End: 2021-05-29 | Stop reason: HOSPADM

## 2021-05-28 RX ORDER — ACETAMINOPHEN 650 MG/1
650 SUPPOSITORY RECTAL EVERY 4 HOURS PRN
Status: DISCONTINUED | OUTPATIENT
Start: 2021-05-28 | End: 2021-05-29 | Stop reason: HOSPADM

## 2021-05-28 RX ORDER — BUPROPION HYDROCHLORIDE 150 MG/1
150 TABLET ORAL DAILY
Status: DISCONTINUED | OUTPATIENT
Start: 2021-05-29 | End: 2021-05-29 | Stop reason: HOSPADM

## 2021-05-28 RX ORDER — NICOTINE 21 MG/24HR
1 PATCH, TRANSDERMAL 24 HOURS TRANSDERMAL DAILY
Status: DISCONTINUED | OUTPATIENT
Start: 2021-05-28 | End: 2021-05-29 | Stop reason: HOSPADM

## 2021-05-28 RX ADMIN — Medication 10 ML: at 23:53

## 2021-05-28 RX ADMIN — ASPIRIN 324 MG: 81 TABLET, CHEWABLE ORAL at 20:03

## 2021-05-28 RX ADMIN — BUSPIRONE HYDROCHLORIDE 15 MG: 15 TABLET ORAL at 23:53

## 2021-05-28 RX ADMIN — ATORVASTATIN CALCIUM 80 MG: 80 TABLET, FILM COATED ORAL at 23:53

## 2021-05-28 ASSESSMENT — ENCOUNTER SYMPTOMS
SHORTNESS OF BREATH: 0
COLOR CHANGE: 0

## 2021-05-28 ASSESSMENT — PAIN SCALES - GENERAL: PAINLEVEL_OUTOF10: 0

## 2021-05-29 ENCOUNTER — APPOINTMENT (OUTPATIENT)
Dept: MRI IMAGING | Age: 41
End: 2021-05-29
Payer: COMMERCIAL

## 2021-05-29 VITALS
BODY MASS INDEX: 47.27 KG/M2 | RESPIRATION RATE: 16 BRPM | TEMPERATURE: 98.5 F | SYSTOLIC BLOOD PRESSURE: 114 MMHG | DIASTOLIC BLOOD PRESSURE: 76 MMHG | WEIGHT: 276.9 LBS | HEART RATE: 85 BPM | HEIGHT: 64 IN | OXYGEN SATURATION: 95 %

## 2021-05-29 LAB
ANION GAP SERPL CALCULATED.3IONS-SCNC: 11 MMOL/L (ref 3–16)
BUN BLDV-MCNC: 7 MG/DL (ref 7–20)
CALCIUM SERPL-MCNC: 8.4 MG/DL (ref 8.3–10.6)
CHLORIDE BLD-SCNC: 104 MMOL/L (ref 99–110)
CHOLESTEROL, TOTAL: 174 MG/DL (ref 0–199)
CO2: 24 MMOL/L (ref 21–32)
CREAT SERPL-MCNC: 0.8 MG/DL (ref 0.6–1.1)
ESTIMATED AVERAGE GLUCOSE: 119.8 MG/DL
GFR AFRICAN AMERICAN: >60
GFR NON-AFRICAN AMERICAN: >60
GLUCOSE BLD-MCNC: 99 MG/DL (ref 70–99)
HBA1C MFR BLD: 5.8 %
HCT VFR BLD CALC: 41.7 % (ref 36–48)
HDLC SERPL-MCNC: 31 MG/DL (ref 40–60)
HEMOGLOBIN: 13.6 G/DL (ref 12–16)
LDL CHOLESTEROL CALCULATED: 114 MG/DL
LV EF: 58 %
LVEF MODALITY: NORMAL
MCH RBC QN AUTO: 29.2 PG (ref 26–34)
MCHC RBC AUTO-ENTMCNC: 32.6 G/DL (ref 31–36)
MCV RBC AUTO: 89.5 FL (ref 80–100)
PDW BLD-RTO: 17.4 % (ref 12.4–15.4)
PLATELET # BLD: 251 K/UL (ref 135–450)
PMV BLD AUTO: 9.3 FL (ref 5–10.5)
POTASSIUM REFLEX MAGNESIUM: 3.6 MMOL/L (ref 3.5–5.1)
RBC # BLD: 4.66 M/UL (ref 4–5.2)
SODIUM BLD-SCNC: 139 MMOL/L (ref 136–145)
TRIGL SERPL-MCNC: 143 MG/DL (ref 0–150)
VLDLC SERPL CALC-MCNC: 29 MG/DL
WBC # BLD: 8.2 K/UL (ref 4–11)

## 2021-05-29 PROCEDURE — 80061 LIPID PANEL: CPT

## 2021-05-29 PROCEDURE — 96372 THER/PROPH/DIAG INJ SC/IM: CPT

## 2021-05-29 PROCEDURE — 2580000003 HC RX 258: Performed by: NURSE PRACTITIONER

## 2021-05-29 PROCEDURE — 96374 THER/PROPH/DIAG INJ IV PUSH: CPT

## 2021-05-29 PROCEDURE — G0378 HOSPITAL OBSERVATION PER HR: HCPCS

## 2021-05-29 PROCEDURE — 70551 MRI BRAIN STEM W/O DYE: CPT

## 2021-05-29 PROCEDURE — 6360000002 HC RX W HCPCS: Performed by: INTERNAL MEDICINE

## 2021-05-29 PROCEDURE — 80048 BASIC METABOLIC PNL TOTAL CA: CPT

## 2021-05-29 PROCEDURE — 6370000000 HC RX 637 (ALT 250 FOR IP): Performed by: NURSE PRACTITIONER

## 2021-05-29 PROCEDURE — 85027 COMPLETE CBC AUTOMATED: CPT

## 2021-05-29 PROCEDURE — 83036 HEMOGLOBIN GLYCOSYLATED A1C: CPT

## 2021-05-29 PROCEDURE — C8929 TTE W OR WO FOL WCON,DOPPLER: HCPCS

## 2021-05-29 PROCEDURE — 6360000004 HC RX CONTRAST MEDICATION: Performed by: NURSE PRACTITIONER

## 2021-05-29 PROCEDURE — 36415 COLL VENOUS BLD VENIPUNCTURE: CPT

## 2021-05-29 PROCEDURE — 97161 PT EVAL LOW COMPLEX 20 MIN: CPT | Performed by: PHYSICAL THERAPIST

## 2021-05-29 PROCEDURE — 6360000002 HC RX W HCPCS: Performed by: NURSE PRACTITIONER

## 2021-05-29 RX ORDER — LORAZEPAM 2 MG/ML
1 INJECTION INTRAMUSCULAR ONCE
Status: COMPLETED | OUTPATIENT
Start: 2021-05-29 | End: 2021-05-29

## 2021-05-29 RX ORDER — ASPIRIN 325 MG
325 TABLET ORAL DAILY
Qty: 30 TABLET | Refills: 3 | Status: SHIPPED | OUTPATIENT
Start: 2021-05-29

## 2021-05-29 RX ORDER — CLOPIDOGREL BISULFATE 75 MG/1
75 TABLET ORAL DAILY
COMMUNITY
End: 2021-08-16 | Stop reason: ALTCHOICE

## 2021-05-29 RX ORDER — CLOPIDOGREL BISULFATE 75 MG/1
75 TABLET ORAL DAILY
Status: DISCONTINUED | OUTPATIENT
Start: 2021-05-29 | End: 2021-05-29 | Stop reason: HOSPADM

## 2021-05-29 RX ADMIN — PERFLUTREN 1.5 ML: 6.52 INJECTION, SUSPENSION INTRAVENOUS at 10:56

## 2021-05-29 RX ADMIN — ASPIRIN 81 MG: 81 TABLET, COATED ORAL at 09:00

## 2021-05-29 RX ADMIN — Medication 10 ML: at 09:01

## 2021-05-29 RX ADMIN — ENOXAPARIN SODIUM 40 MG: 40 INJECTION SUBCUTANEOUS at 09:00

## 2021-05-29 RX ADMIN — BUSPIRONE HYDROCHLORIDE 15 MG: 15 TABLET ORAL at 09:01

## 2021-05-29 RX ADMIN — LORAZEPAM 1 MG: 2 INJECTION INTRAMUSCULAR; INTRAVENOUS at 11:24

## 2021-05-29 RX ADMIN — BUPROPION HYDROCHLORIDE 150 MG: 150 TABLET, EXTENDED RELEASE ORAL at 09:00

## 2021-05-29 RX ADMIN — LOSARTAN POTASSIUM 50 MG: 50 TABLET, FILM COATED ORAL at 09:01

## 2021-05-29 RX ADMIN — CLOPIDOGREL BISULFATE 75 MG: 75 TABLET ORAL at 15:43

## 2021-05-29 ASSESSMENT — PAIN SCALES - GENERAL
PAINLEVEL_OUTOF10: 0

## 2021-05-29 NOTE — PROGRESS NOTES
NAME:  Ariela Hines  YOB: 1980  MEDICAL RECORD NUMBER:  [id number]  TODAYS DATE:  5/28/2021    Discussed personal risk factors for Stroke /TIA with patient/family, and ways to reduce the risk for a recurrent stroke. Patient's personal risk factors which were identified are:     [] Alcohol Abuse: check with your physician before any alcohol consumption. [] Atrial fibrillation: may cause blood clots. [] Drug Abuse: Seek help, talk with your doctor  [] Clotting Disorder  [] Diabetes  [] Family history of stroke or heart disease  [x] High Blood Pressure/Hypertension: work with your physician. [x] High cholesterol: monitor cholesterol levels with your physician. [x] Overweight/Obesity: work with your physician for your ideal body weight. [x] Physical Inactivity: get regular exercise as directed by your physician. [x] Personal history of previous TIA or stroke  [x] Poor Diet; decrease salt (sodium) in your diet, follow diet directed by physician. [x] Smoking: Cigarette/Cigar: stop smoking. Advised pt. that you can reduce your risk for stroke/TIA by modifying/controlling the risk factors that you have. Pt.advised to take the medications as prescribed, which will be detailed in the discharge instructions, and to not stop taking them without consulting their physician. In addition, pt. advised to maintain a healthy diet, exercise regularly and to not smoke. [facility]'s Stroke treatment and prevention, Managing your recovery  notebook  provided and/or reviewed  with patient/family. The notebook includes, but not limited to, sections addressing warning signs & symptoms of a stroke, which are: sudden numbness or weakness especially on one side of the body, sudden confusion, difficulty speaking or understanding, sudden changes in vision, sudden dizziness or loss of balance/ coordination, or sudden severe headache.   The need to call EMS (911) immediately if signs & symptoms occur is emphasized . The notebook also provides education on Stroke community resources and stroke advocacy. The need for follow-up after discharge was highlighted with patient/family with them being able to repeat understanding of the importance of this.       Electronically signed by Tito Sandoval RN on 5/28/2021 at 11:55 PM

## 2021-05-29 NOTE — PROGRESS NOTES
Medication Reconciliation     List of medications patient is currently taking is complete. Source of information:   1. Conversation with patient at bedside  2. EPIC records        Notes regarding home medications:  1. Patient has not received her buspirone or atorvastatin yet today as she usually takes those at night.   Elizabeth Hernandez, Pharmacy Intern 5/28/2021 8:38 PM

## 2021-05-29 NOTE — PLAN OF CARE
Problem: HEMODYNAMIC STATUS  Goal: Patient has stable vital signs and fluid balance  Outcome: Ongoing     Problem: ACTIVITY INTOLERANCE/IMPAIRED MOBILITY  Goal: Mobility/activity is maintained at optimum level for patient  Outcome: Ongoing     Problem: COMMUNICATION IMPAIRMENT  Goal: Ability to express needs and understand communication  Outcome: Ongoing     Problem: SAFETY  Goal: Free from accidental physical injury  Outcome: Ongoing  Goal: Free from intentional harm  Outcome: Ongoing

## 2021-05-29 NOTE — ED PROVIDER NOTES
[address] [facility]      Pt Name: Mya Mcmullen  MRN: [id number]  [place] 1980  Date of evaluation: 5/28/2021  Provider: ANTHONY Stanford    This patient was not seen and evaluated by the attending physician No att. providers found. CHIEF COMPLAINT       Chief Complaint   Patient presents with   Dhruv Bautistaort     was at [facility] left ama because she didnt want to have an MRI because it was too tight pt states she doesnt currently have blurred vision       CRITICAL CARE TIME   I performed a total Critical Care time of 15 minutes, excluding separately reportable procedures. There was a high probability of clinically significant/life threatening deterioration in the patient's condition which required my urgent intervention. Not limited to multiple reexaminations, discussions with attending physician and consultants. HISTORY OF PRESENT ILLNESS  (Location/Symptom, Timing/Onset, Context/Setting, Quality, Duration, Modifying Factors, Severity.)   Mouna Betts is a 36 y.o. female who presents to the emergency department accompanied by her significant other. Last night about 22 hours ago around 9:30 PM she had a 10-second episode of loss of vision. She states that it was blurred vision and she was seeing spots in both eyes. She had something similar happen in 2015 although at that time she states that she lost vision in 1 eye. She was diagnosed with a stroke. She is been taking a baby aspirin. She is a smoker with hypertension hyperlipidemia. No calf tenderness or leg swelling no chest pain. She was admitted to [facility] and states that she had tests and lab work but was in the MRI machine to have an MRI of her brain after seeing neurology when she \"panicked. \"  She states that she ended up leaving ama and then got home and realized that this was not something she wanted to do.   She states that she decided she wanted to be treated at [facility]. Nursing Notes were reviewed and I agree. REVIEW OF SYSTEMS    (2-9 systems for level 4, 10 or more for level 5)     Review of Systems   Constitutional: Negative for fever. Eyes: Positive for visual disturbance (resolved). Respiratory: Negative for shortness of breath. Cardiovascular: Negative for chest pain. Musculoskeletal: Negative for gait problem. Skin: Negative for color change, rash and wound. Neurological: Negative for tremors, seizures, facial asymmetry, speech difficulty, weakness, numbness and headaches. Psychiatric/Behavioral: Negative for agitation, behavioral problems and confusion. Except as noted above the remainder of the review of systems was reviewed and negative.        PAST MEDICAL HISTORY         Diagnosis Date    Anxiety     Cerebral artery occlusion with cerebral infarction ([facility] [address]     Graves disease     Hyperlipidemia     Hypertension        SURGICAL HISTORY           Procedure Laterality Date    DENTAL SURGERY      TONSILLECTOMY         CURRENT MEDICATIONS       Previous Medications    ASPIRIN LOW DOSE 81 MG EC TABLET    Take 162 mg by mouth daily     ATORVASTATIN (LIPITOR) 80 MG TABLET    Take 80 mg by mouth daily    BUPROPION (WELLBUTRIN XL) 150 MG EXTENDED RELEASE TABLET    Take 150 mg by mouth every morning     BUSPIRONE (BUSPAR) 15 MG TABLET    Take 15 mg by mouth nightly     LOSARTAN (COZAAR) 50 MG TABLET    Take 50 mg by mouth daily        ALLERGIES     Morphine    FAMILY HISTORY           Problem Relation Age of Onset    Arthritis Mother     High Blood Pressure Mother     High Cholesterol Mother     Cancer Maternal Uncle     Diabetes Paternal Aunt     Diabetes Paternal Uncle     Cancer Maternal Grandmother     Cancer Maternal Grandfather      Family Status   Relation Name Status    Mother  (Not Specified)    MUnc  (Not Specified)    PAunt  (Not Specified)    PUnc  (Not Specified)    MGM  (Not Specified)    MGF  (Not Specified)        SOCIAL HISTORY      reports that she has quit smoking. Her smoking use included cigarettes. She has never used smokeless tobacco. She reports that she does not drink alcohol and does not use drugs. PHYSICAL EXAM    (up to 7 for level 4, 8 or more for level 5)     ED Triage Vitals [05/28/21 1634]   BP Temp Temp Source Pulse Resp SpO2 Height Weight   (!) 198/92 96.3 °F (35.7 °C) Temporal 87 16 99 % 5' 4\" (1.626 m) 276 lb 14.4 oz (125.6 kg)       Physical Exam  Vitals and nursing note reviewed. Constitutional:       Appearance: Normal appearance. HENT:      Head: Normocephalic and atraumatic. Mouth/Throat:      Mouth: Mucous membranes are moist.   Eyes:      Pupils: Pupils are equal, round, and reactive to light. Cardiovascular:      Rate and Rhythm: Normal rate. Pulses: Normal pulses. Pulmonary:      Effort: Pulmonary effort is normal. No respiratory distress. Musculoskeletal:         General: Normal range of motion. Cervical back: Normal range of motion. Skin:     General: Skin is warm. Neurological:      General: No focal deficit present. Mental Status: She is alert and oriented to person, place, and time. Cranial Nerves: No cranial nerve deficit. Sensory: No sensory deficit. Motor: No weakness. Gait: Gait normal.   Psychiatric:         Mood and Affect: Mood normal.         Behavior: Behavior normal.         DIAGNOSTIC RESULTS     NONE    LABS:  Labs Reviewed - No data to display    All other labs were within normal range or not returned as of this dictation. EMERGENCY DEPARTMENT COURSE and DIFFERENTIAL DIAGNOSIS/MDM:   Vitals:    Vitals:    05/28/21 1634   BP: (!) 198/92   Pulse: 87   Resp: 16   Temp: 96.3 °F (35.7 °C)   TempSrc: Temporal   SpO2: 99%   Weight: 276 lb 14.4 oz (125.6 kg)   Height: 5' 4\" (1.626 m)     Patient had episode of blurred/loss of vision yesterday evening that resolved.   She was seen at [facility] evaluated by

## 2021-05-29 NOTE — PROGRESS NOTES
Physical Therapy  Cara Hadley  [id number]  [id number]    PT orders received and chart reviewed; attempted to see for PT eval however pt going for MRI; will attempt later as schedule permits  [facility], [address] [place] [address], [facility]

## 2021-05-29 NOTE — PROGRESS NOTES
Occupational Therapy  Attempt Note    Name: Marry Ortiz  : 1980  MRN: [id number]  Room: 5181  Date: 2021    Orders received and chart reviewed. Spoke with RN Nury Duenas) who reports pt is going to MRI at this time. Will re-attempt as schedule permits.     Electronically signed by JAY Lino #[id number] on 21 at 11:25 AM EDT

## 2021-05-29 NOTE — PROGRESS NOTES
NAME:  Nabor Reed  YOB: 1980  MEDICAL RECORD NUMBER:  [id number]  TODAYS DATE:  5/29/2021    Discussed personal risk factors for Stroke /TIA with patient/family, and ways to reduce the risk for a recurrent stroke. Patient's personal risk factors which were identified are:     [] Alcohol Abuse: check with your physician before any alcohol consumption. [] Atrial fibrillation: may cause blood clots. [] Drug Abuse: Seek help, talk with your doctor  [] Clotting Disorder  [] Diabetes  [] Family history of stroke or heart disease  [x] High Blood Pressure/Hypertension: work with your physician. [x] High cholesterol: monitor cholesterol levels with your physician. [x] Overweight/Obesity: work with your physician for your ideal body weight.  [] Physical Inactivity: get regular exercise as directed by your physician. [x] Personal history of previous TIA or stroke  [] Poor Diet; decrease salt (sodium) in your diet, follow diet directed by physician. [x] Smoking: Cigarette/Cigar: stop smoking. Advised pt. that you can reduce your risk for stroke/TIA by modifying/controlling the risk factors that you have. Pt.advised to take the medications as prescribed, which will be detailed in the discharge instructions, and to not stop taking them without consulting their physician. In addition, pt. advised to maintain a healthy diet, exercise regularly and to not smoke. [facility]'s Stroke treatment and prevention, Managing your recovery  notebook  provided and/or reviewed  with patient/family. The notebook includes, but not limited to, sections addressing warning signs & symptoms of a stroke, which are: sudden numbness or weakness especially on one side of the body, sudden confusion, difficulty speaking or understanding, sudden changes in vision, sudden dizziness or loss of balance/ coordination, or sudden severe headache.   The need to call EMS (911) immediately if signs & symptoms occur is emphasized . The notebook also provides education on Stroke community resources and stroke advocacy. The need for follow-up after discharge was highlighted with patient/family with them being able to repeat understanding of the importance of this.       Electronically signed by Jac Samaneigo RN on 5/29/2021 at 10:27 AM

## 2021-05-29 NOTE — PROGRESS NOTES
Pt arrived via stretcher from ED to room 5281. Heart monitor 104 connected and verified with [facility]. VS, assessment, and admission complete. 4 eyes assessment complete. Pt oriented to unit and room. Call light and bedside table in reach. All questions answered. Pt resting quietly in bed with no complaints or voiced needs at this time.   Electronically signed by Anton Tillman RN on 5/28/2021 at 11:55 PM

## 2021-05-29 NOTE — H&P
Hospital Medicine History & Physical      PCP: No primary care provider on file. Date of Admission: 5/28/2021    Date of Service: Pt seen/examined on 5/28/2021 and Placed in Observation. Chief Complaint:  Blurred vision, floaters      History Of Present Illness:      36 y.o. female with PMHx of Anxiety, CVA, HLD and HTN presented to [facility] for evaluation of blurred vision and floaters. Patient states this happened Thursday evening lasting less than a minute. She went to [facility] ED and had imaging performed. She was admitted to [facility] and saw the neurologist this morning. She was in the process of having the MRI performed when she panicked and left AGAINST MEDICAL ADVICE. She spoke to her PCP who wrote a prescription for an outpatient MRI but she was worried that something might happen over the weekend and came in to have her work-up finished. Patient has had no further symptoms. She has no headache, dizziness or vision changes now. No focal weakness. Patient had a CVA in 2015 which caused right side weakness with left visual field loss. She reports at that time it was like a curtain dropped on her visual field. She reports it was very different than the symptoms she had last night. Past Medical History:          Diagnosis Date    Anxiety     Cerebral artery occlusion with cerebral infarction ([facility] [address]     Graves disease     Hyperlipidemia     Hypertension        Past Surgical History:          Procedure Laterality Date    DENTAL SURGERY      TONSILLECTOMY         Medications Prior to Admission:      Prior to Admission medications    Medication Sig Start Date End Date Taking?  Authorizing Provider   ASPIRIN LOW DOSE 81 MG EC tablet Take 162 mg by mouth daily  1/20/21  Yes Historical Provider, MD   buPROPion (WELLBUTRIN XL) 150 MG extended release tablet Take 150 mg by mouth every morning  2/19/21  Yes Historical Provider, MD   losartan (COZAAR) 50 MG tablet Take 50 mg by mouth daily  2/19/21  Yes Historical Provider, MD   atorvastatin (LIPITOR) 80 MG tablet Take 80 mg by mouth daily 1/22/20  Yes Historical Provider, MD   busPIRone (BUSPAR) 15 MG tablet Take 15 mg by mouth nightly    Yes Historical Provider, MD       Allergies:  Morphine    Social History:      The patient currently lives home with     TOBACCO:   reports that she has been smoking cigarettes. She has a 15.00 pack-year smoking history. She has never used smokeless tobacco.  ETOH:   reports no history of alcohol use. Family History:      Reviewed in detail positive as follows:        Problem Relation Age of Onset    Arthritis Mother     High Blood Pressure Mother     High Cholesterol Mother     Cancer Maternal Uncle     Diabetes Paternal Aunt     Diabetes Paternal Uncle     Cancer Maternal Grandmother     Cancer Maternal Grandfather        REVIEW OF SYSTEMS:   Pertinent positives as noted in the HPI. All other systems reviewed and negative. PHYSICAL EXAM PERFORMED:    /70   Pulse 79   Temp 97.9 °F (36.6 °C) (Oral)   Resp 18   Ht 5' 4\" (1.626 m)   Wt 276 lb 7.3 oz (125.4 kg)   LMP 05/28/2021   SpO2 97%   BMI 47.45 kg/m²     General appearance: Well-developed, well-nourished  female sitting upright on ED cart in no apparent distress, appears stated age and cooperative. HEENT:  Normal cephalic, atraumatic without obvious deformity. Pupils equal, round, and reactive to light. Extra ocular muscles intact. Conjunctivae/corneas clear. Neck: Supple, with full range of motion. No jugular venous distention. Trachea midline. Respiratory:  Normal respiratory effort. Clear to auscultation, bilaterally without Rales/Wheezes/Rhonchi. Cardiovascular:  Regular rate and rhythm without murmurs, rubs or gallops. No lower extremity edema  Abdomen: Soft, morbidly obese abdomen, non-tender, non-distended, without rebound or guarding. Normal bowel sounds.   Musculoskeletal:  No clubbing, cyanosis or edema bilaterally. Full range of motion without deformity. Skin: Skin color, texture, turgor normal.  No rashes or lesions. Neurologic:  Neurovascularly intact without any focal sensory/motor deficits. Cranial nerves: II-XII intact, grossly non-focal.  Psychiatric:  Alert and oriented, anxious, thought content appropriate, normal insight  Capillary Refill: Brisk,< 3 seconds   Peripheral Pulses: +2 palpable, equal bilaterally       Labs:     No results for input(s): WBC, HGB, HCT, PLT in the last 72 hours. No results for input(s): NA, K, CL, CO2, BUN, CREATININE, CALCIUM, PHOS in the last 72 hours. Invalid input(s): MAGNES  No results for input(s): AST, ALT, BILIDIR, BILITOT, ALKPHOS in the last 72 hours. No results for input(s): INR in the last 72 hours. No results for input(s): Patrick Hind in the last 72 hours. Urinalysis:    No results found for: Suni Johnson [facility] [address], [address] [place] [address] [address]., [address], [facility] [address]94    Radiology:       MRI brain without contrast    (Results Pending)       ASSESSMENT:    Active Hospital Problems    Diagnosis Date Noted    Visual disturbance [H53.9] 05/28/2021         PLAN:    Possible TIA/CVA  - with symptoms: blurred vision and floaters  - admit to OBS to RO TIA/CVA  - out of the tPA window  - head CT neg for acute pathology  - [facility] head[facility] from [facility]: Right M2-M4 branches are decreased in size and number as compared to the left M2-M4 branches, beginning at the right M1/M2 junction. This likely represents age-indeterminate occlusion or severe stenosis of some of the right M2-M4 branches. Tortuous M1 segment of the right MCA. Complete occlusion of left ICA. - Complete occlusion of the left ICA. Moderate mixed plaque in the left common carotid artery with mild stenosis.     - MRI and ECHO  - ASA, statin  - consult neurology   - check lipids, HBA1c  - allow permissive HTN, SBP < 220, DBP < 110    Cerebral artery occlusion with CVA (2015)  - no residual deficits  - continue asa and statin    Essential (primary) hypertension   - monitor blood pressure  - continue home meds     Hyperlipidemia   - continue statin    DVT Prophylaxis: Lovenox  Diet: No diet orders on file  Code Status: Full Code    PT/OT Eval Status: pending    [address] [facility], APRN - CNP    Thank you No primary care provider on file. for the opportunity to be involved in this patient's care. If you have any questions or concerns please feel free to contact me at [phone].

## 2021-05-29 NOTE — PROGRESS NOTES
Reviewed all dc instructions with pt and verbalized understanding. All questions answered. Iv and tele removed. Pt getting dressed and waiting on ride.

## 2021-05-29 NOTE — PROGRESS NOTES
Physical Therapy    Facility/Department: [address] PROGRESSIVE CARE  Initial Assessment/Discharge Note    NAME: Jonas Choi  : 1980  MRN: [id number]    Date of Service: 2021    Discharge Recommendations:  Home with assist PRN   PT Equipment Recommendations  Equipment Needed: No  Mouna Murillo scored a 24/ on the AM-PAC short mobility form. At this time, no further PT is recommended upon discharge. Recommend patient returns to prior setting with prior services. Assessment   Assessment: pt is a 37 yo female who was adm to hosp with visual deficits; symptoms have resolved however MRI (+) Punctate focus of acute/subacute ischemia in the left frontal lobe. Pt currently is Ind with all functional tasks and has no needs for any further therapy  Prognosis: Good  Decision Making: Low Complexity  PT Education: PT Role  Barriers to Learning: none  REQUIRES PT FOLLOW UP: No  Activity Tolerance  Activity Tolerance: Patient Tolerated treatment well       Patient Diagnosis(es): The primary encounter diagnosis was Blurred vision. A diagnosis of Hypertension, unspecified type was also pertinent to this visit. has a past medical history of Anxiety, Cerebral artery occlusion with cerebral infarction ([address] Graves disease, Hyperlipidemia, and Hypertension. has a past surgical history that includes Tonsillectomy and Dental surgery. Restrictions     Vision/Hearing  Vision: Within Functional Limits  Hearing: Within functional limits     Subjective  General  Chart Reviewed: Yes  Additional Pertinent Hx: per H&P note:  36 y.o. female with PMHx of Anxiety, CVA, HLD and HTN presented to [facility] for evaluation of blurred vision and floaters. Patient states this happened Thursday evening lasting less than a minute. She went to [facility] ED and had imaging performed. She was admitted to [facility] and saw the neurologist this morning.  She was in the process of having the MRI performed when she to Sit: Independent  Transfers  Sit to Stand: Independent  Stand to sit:  Independent  Ambulation  Ambulation?: Yes  Ambulation 1  Surface: level tile  Device: No Device  Assistance: Independent  Quality of Gait: no LOB or gait deviations  Distance: 48' (pt was finishing lunch therefore deferred futher gait or stairs)  Comments: simulated stairs by completing SLS and mini squats and deja squats at sink with lifting up on alternate leg     Balance  Sitting - Static: Good  Sitting - Dynamic: Good  Standing - Static: Good  Standing - Dynamic: Good        Plan   Safety Devices  Type of devices: Call light within reach, Left in chair, Nurse notified, All fall risk precautions in place    G-Code       OutComes Score                                                  AM-PAC Score  AM-PAC Inpatient Mobility Raw Score : 24 (05/29/21 1249)  AM-PAC Inpatient T-Scale Score : 61.14 (05/29/21 1249)  Mobility Inpatient CMS 0-100% Score: 0 (05/29/21 1249)  Mobility Inpatient CMS G-Code Modifier : CH (05/29/21 1249)          Goals          Therapy Time   Individual Concurrent Group Co-treatment   Time In 1228         Time Out 1249         Minutes 21                 CEZAR JAVED PT    Electronically signed by CEZAR JAVED PT on 5/29/2021 at 12:50 PM

## 2021-05-29 NOTE — PROGRESS NOTES
Hospitalist Progress Note      PCP: No primary care provider on file. Date of Admission: 5/28/2021    Chief Complaint: blurry vision        Subjective: Neurological symptoms have resolved and she is back to baseline      Medications:  Reviewed    Infusion Medications    sodium chloride       Scheduled Medications    atorvastatin  80 mg Oral Nightly    buPROPion  150 mg Oral Daily    busPIRone  15 mg Oral BID    losartan  50 mg Oral Daily    sodium chloride flush  5-40 mL Intravenous 2 times per day    enoxaparin  40 mg Subcutaneous Daily    aspirin  81 mg Oral Daily    Or    aspirin  300 mg Rectal Daily    nicotine  1 patch Transdermal Daily     PRN Meds: sodium chloride flush, sodium chloride, promethazine **OR** ondansetron, polyethylene glycol, acetaminophen **OR** acetaminophen, labetalol    No intake or output data in the 24 hours ending 05/29/21 1234    Exam:    BP (!) 156/76   Pulse 63   Temp 98.6 °F (37 °C) (Oral)   Resp 8   Ht 5' 4\" (1.626 m)   Wt 276 lb 14.4 oz (125.6 kg)   LMP 05/28/2021   SpO2 98%   BMI 47.53 kg/m²     General appearance: No apparent distress, appears stated age and cooperative. HEENT: Pupils equal, round, and reactive to light. Conjunctivae/corneas clear. Neck: Supple, with full range of motion. No jugular venous distention. Trachea midline. Respiratory:  Normal respiratory effort. Clear to auscultation, bilaterally without Rales/Wheezes/Rhonchi. Cardiovascular: Regular rate and rhythm with normal S1/S2 without murmurs, rubs or gallops. Abdomen: Soft, non-tender, non-distended with normal bowel sounds. Musculoskeletal: No clubbing, cyanosis or edema bilaterally. Full range of motion without deformity. Skin: Skin color, texture, turgor normal.  No rashes or lesions. Neurologic:  Neurovascularly intact without any focal sensory/motor deficits.  Cranial nerves: II-XII intact, grossly non-focal.  Psychiatric: Alert and oriented, thought content appropriate, normal insight    Labs:   Recent Labs     05/29/21  0705   WBC 8.2   HGB 13.6   HCT 41.7        Recent Labs     05/29/21  0705      K 3.6      CO2 24   BUN 7   CREATININE 0.8   CALCIUM 8.4     No results for input(s): AST, ALT, BILIDIR, BILITOT, ALKPHOS in the last 72 hours. No results for input(s): INR in the last 72 hours. No results for input(s): Mirza Killings in the last 72 hours. Assessment/Plan:    Active Hospital Problems    Diagnosis Date Noted    Visual disturbance [H53.9] 05/28/2021       1. Blurry vision - MRI brain pending. Neuro on board. Will F/U with A1c and lipid panel. [facility] head and neck done at [facility]. Continue asa and lipitor  2. 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 HTN - Continue losartan  4.  Anxiety - Continue buspar and wellbutrin    DVT Prophylaxis: lovenox  Diet: DIET LOW SODIUM 2 GM;  Code Status: Full Code    PT/OT Eval Status: on board    Dispo - pending clinical course    Debbie Becerril DO

## 2021-05-29 NOTE — CONSULTS
Neurology Consult Note  Reason for Consult: TIA-visual disturbance. Chief complaint: \"vision spots and blurry vision\"    Dr Christiano Bey MD asked me to see Jonas Choi in consultation for evaluation of TIA-visual disturbance. History of Present Illness:  I obtained my information via the patient, supplemented with chart review. Jonas Choi is a 36 y.o. female with hx of anxiety, prior stroke  with no residual deficits, Graves disease, HTN, HLD who was admitted on 5/28/21 for evaluation of blurred vision. About 22 hrs prior to presentation patient had an acute 10 second episode of blurry vision with black and gold floats of bilateral visual fields. She was looking at a tablet when symptoms started. She was very concern and then proceeded into a panic attack as she did have vision changes- left eye vision loss in 2015 for which she was diagnosed with a stroke. She presented originally to [facility]. She attempted to have an MRI completed for which she panicked and ultimately left AMA. When she returned home she decided that it was best to have work up completed and decided to come to [facility] for further evaluation. She denies any headache, vision loss, speech changes, weakness, numbness or tingling, dizziness. She has not had any further reoccurrence. She did have some chest pain with her panic attack and reports palpitations. She has not been sick recently. Home medications include: 162 mg ASA, and 80mg atrovastatin. She is a current smoker. Today the patient reports that she continues to feel at baseline without any reoccurrence. Outside records were reviewed for which CTA vessel imaging revealed age-indeterminate occlusion or severe stenosis of some of the right M2-M4 branches, Tortuous M1 segment of the right MCA. Complete occlusion of left ICA. Moderate mixed plaque in the left common carotid artery with mild stenosis.  She does follow with vascular for her carotid disease. Posterior circulation grossly unremarkable.        Medical History:  Past Medical History:   Diagnosis Date    Anxiety     Cerebral artery occlusion with cerebral infarction ([facility] [address]     Graves disease     Hyperlipidemia     Hypertension      Past Surgical History:   Procedure Laterality Date    DENTAL SURGERY      TONSILLECTOMY       Scheduled Meds:   atorvastatin  80 mg Oral Nightly    buPROPion  150 mg Oral Daily    busPIRone  15 mg Oral BID    losartan  50 mg Oral Daily    sodium chloride flush  5-40 mL Intravenous 2 times per day    enoxaparin  40 mg Subcutaneous Daily    aspirin  81 mg Oral Daily    Or    aspirin  300 mg Rectal Daily    nicotine  1 patch Transdermal Daily     Continuous Infusions:   sodium chloride       PRN Meds:.sodium chloride flush, sodium chloride, promethazine **OR** ondansetron, polyethylene glycol, perflutren lipid microspheres, acetaminophen **OR** acetaminophen, labetalol    Medications Prior to Admission: ASPIRIN LOW DOSE 81 MG EC tablet, Take 162 mg by mouth daily   buPROPion (WELLBUTRIN XL) 150 MG extended release tablet, Take 150 mg by mouth every morning   losartan (COZAAR) 50 MG tablet, Take 50 mg by mouth daily   atorvastatin (LIPITOR) 80 MG tablet, Take 80 mg by mouth daily  busPIRone (BUSPAR) 15 MG tablet, Take 15 mg by mouth nightly     Allergies   Allergen Reactions    Morphine Nausea Only       Family History   Problem Relation Age of Onset    Arthritis Mother     High Blood Pressure Mother     High Cholesterol Mother     Cancer Maternal Uncle     Diabetes Paternal Aunt     Diabetes Paternal Uncle     Cancer Maternal Grandmother     Cancer Maternal Grandfather        Social History     Tobacco Use   Smoking Status Current Every Day Smoker    Packs/day: 1.00    Years: 15.00    Pack years: 15.00    Types: Cigarettes   Smokeless Tobacco Never Used   Tobacco Comment    Quit smoking 7 months ago, but started up again     Social History Substance and Sexual Activity   Drug Use No     Social History     Substance and Sexual Activity   Alcohol Use No       ROS:  Constitutional- No weight loss or fevers  Eyes- No diplopia. No photophobia. +visual disturbance. Ears/nose/throat- No dysphagia. No Dysarthria  Cardiovascular- + palpitations. + episode chest pain  Respiratory- No dyspnea. No Cough  Gastrointestinal- No Abdominal pain. No Vomiting. Genitourinary- No incontinence. No urinary retention  Musculoskeletal- No myalgia. No arthralgia  Skin- No rash. No easy bruising. Psychiatric- + depression. + anxiety No SI or HI  Endocrine- No diabetes. No thyroid issues. Hematologic- No bleeding difficulty. No fatigue  Neurologic- No weakness. No Headache. Exam:  Vitals:    05/28/21 2300 05/29/21 0030 05/29/21 0300 05/29/21 0700   BP: 133/73 135/70 (!) 142/90 129/71   Pulse: 81 79 71 60   Resp: 17 18 18 20   Temp: 98.1 °F (36.7 °C) 97.9 °F (36.6 °C) 98.5 °F (36.9 °C) 98.1 °F (36.7 °C)   TempSrc: Oral Oral Oral Oral   SpO2: 98% 97% 96% 98%   Weight: 276 lb 7.3 oz (125.4 kg)  276 lb 14.4 oz (125.6 kg)    Height: 5' 4\" (1.626 m)         Constitutional    Vital signs: BP, HR, and RR reviewed   General Alert, no distress, well-nourished  Eyes: unable to visualize the fundi  Cardiovascular: pulses symmetric in all 4 extremities. No peripheral edema. Psychiatric: cooperative with examination, no  psychotic behavior noted. Neurologic  Mental status:   orientation to person, place, time and situation   General fund of knowledge:  grossly intact   Memory: Short term and long term intact   Attention intact as able to attend well to the exam     Language fluent in conversation   Comprehension intact; follows simple commands  Cranial nerves:   CN2: Visual Fields full w/o extinction on confrontational testing   CN 3,4,6: extraocular muscles intact, PERRLA @ 4mm.    CN5: V1: V2: V3: intact to pinprick sensation bilaterally  CN7: upper and lower facial symmetric without dysarthria  CN8: hearing grossly intact to conversation. CN9/10: palate elevated symmetrically  CN11: trap full strength on shoulder shrug bilaterally, SCM without weakness. CN12: tongue midline with protrusion. Able to move tongue side to side. Strength: Grasp: BUE 5/5 . RUE: 5/5 Shoulder abduction, elbow flexion, elbow extension. LUE: 5/5  Shoulder abduction, elbow flexion, elbow extension. Dorsiflexion: R 5/5, L 5/5 ;  Plantar flexion: R 5/5, L 5/5  RLE: 5/5 Hip flexion, Knee flexion, Knee extension. LLE: 5/5 Hip flexion, Knee flexion, Knee extension. Deep tendon reflexes:   Present, symmetric throughout. No distinguishable hyperreflexia though assessment difficult given patient cooperation. Sensory: light touch intact in all 4 extremities. pinprick sensation intact in all extremities. No sensory extinction on double simultaneous stimulation  Cerebellar/coordination: finger nose finger normal without ataxia  Tone: normal in all 4 extremities  Gait: normal gait    Labs  Na: 139  K: 3.6  BUN: 7  Creatinine: 0.8  Glucose: 99  Calcium: 8.4    LDL:  100 (care everywhere)  TSH: 3.49 (care everywhere)    WBC: 8.2  RBC: 4.66  Hgb: 13.6  Hct: 41.7  Platelet: 106    Studies  CT Head w/o 5/27/21: Care Everywhere. Read only: Reviewed the read. No acute intracranial abnormality. CTA  Head & neck w/ w/o 5/27/21: Care Everywhere. Read only: Reviewed the read. Right M2-M4 branches are decreased in size and number as compared to the left M2-M4 branches, beginning at the right M1/M2 junction. This likely represents age-indeterminate occlusion or severe stenosis of some of the right M2-M4 branches. Tortuous M1 segment of the right MCA. Complete occlusion of left ICA. Moderate mixed plaque in the left common carotid artery with mild stenosis. Bilateral Carotid Duplex 5/28/21: Care everywhere. < 50% stenosis of the right internal carotid artery based on velocity criteria.   Chronic total occlusion

## 2021-05-29 NOTE — PROGRESS NOTES
4 Eyes Skin Assessment     NAME:  Nora Lucas  YOB: 1980  MEDICAL RECORD NUMBER:  [id number]    The patient is being assess for  Admission    I agree that 2 RN's have performed a thorough Head to Toe Skin Assessment on the patient. ALL assessment sites listed below have been assessed. Areas assessed by both nurses:    Head, Face, Ears, Shoulders, Back, Chest, Arms, Elbows, Hands, Sacrum. Buttock, Coccyx, Ischium and Legs. Feet and Heels        Does the Patient have a Wound?  No noted wound(s)       Ren Prevention initiated:  No   Wound Care Orders initiated:  No    Pressure Injury (Stage 3,4, Unstageable, DTI, NWPT, and Complex wounds) if present place consult order under [de-identified] No    New and Established Ostomies if present place consult order under : No      Nurse 1 eSignature: Electronically signed by Ese Vizcaino RN on 5/28/21 at 11:55 PM EDT    **SHARE this note so that the co-signing nurse is able to place an eSignature**    Nurse 2 eSignature: Electronically signed by Juancho Lassiter RN on 5/29/21 at 1:01 AM EDT

## 2021-05-29 NOTE — PROGRESS NOTES
Speech Language Pathology    Order for SLP eval/tx received per stroke protocol. Patient met at bedside, role of SLP described, and speech/language/cognition/dysphagia SLP domains of interest reviewed. Patient currently denies impairments to indicate evaluation and politely declines SLP eval at this time. Patient encouraged to request SLP eval/tx in the future if status/needs should change. ST to discontinue evaluation attempts per patient request. Please re-order if status should change and/or if MD deems evaluation indicated despite patient's reported preference at this time. Thank you. Amanda Villaseñor [facility], [address] [facility], [id number]  Speech-Language Pathologist

## 2021-05-29 NOTE — PROGRESS NOTES
Occupational Therapy  Progress Note    Name: Gretchen Chauhan  : 1980  MRN: [id number]  Room: 6121  Date: 2021     OT orders received and chart reviewed. Spoke with PT who reports pt is independent and has no needs at this time. Will cancel order. If the need for further OT arises, please re-order.     Electronically signed by Taisha Garg OTR/L #[id number] on 21 at 1:03 PM EDT

## 2021-05-29 NOTE — PROGRESS NOTES
Spoke with dr. Talon Levy about pt being discharged. Gave verbal order for discharge and verbal discharge instructions as she doesn't have access at this time to the computer. Changes are as follows: Take plavix 75 mg once daily along with a baby asa (aspirin 81mg) for 3 weeks. Once this is finished, take only a regular whole aspirin 325mg once daily until further recommended by neurology. Also to follow up with neurology and with vascular surgery once discharged. plavix 75 daily for 3 weeks [facility] [place] at this time also under dr. Jourdan Andrea name with her verbal order. Pt updated.

## 2021-06-11 ENCOUNTER — OFFICE VISIT (OUTPATIENT)
Dept: SURGERY | Age: 41
End: 2021-06-11
Payer: COMMERCIAL

## 2021-06-11 VITALS — WEIGHT: 274 LBS | SYSTOLIC BLOOD PRESSURE: 136 MMHG | BODY MASS INDEX: 47.03 KG/M2 | DIASTOLIC BLOOD PRESSURE: 72 MMHG

## 2021-06-11 DIAGNOSIS — Z72.0 TOBACCO USE: ICD-10-CM

## 2021-06-11 DIAGNOSIS — E78.5 HYPERLIPIDEMIA, UNSPECIFIED HYPERLIPIDEMIA TYPE: ICD-10-CM

## 2021-06-11 DIAGNOSIS — I65.23 BILATERAL CAROTID ARTERY STENOSIS: Primary | ICD-10-CM

## 2021-06-11 DIAGNOSIS — I63.232 CEREBROVASCULAR ACCIDENT (CVA) DUE TO OCCLUSION OF LEFT CAROTID ARTERY (HCC): ICD-10-CM

## 2021-06-11 DIAGNOSIS — E66.01 MORBID OBESITY DUE TO EXCESS CALORIES (HCC): ICD-10-CM

## 2021-06-11 DIAGNOSIS — I65.22 OCCLUSION OF LEFT CAROTID ARTERY: ICD-10-CM

## 2021-06-11 DIAGNOSIS — I70.213 INTERMITTENT CLAUDICATION OF BOTH LOWER EXTREMITIES DUE TO ATHEROSCLEROSIS (HCC): ICD-10-CM

## 2021-06-11 DIAGNOSIS — I65.02 ASYMPTOMATIC STENOSIS OF LEFT VERTEBRAL ARTERY: ICD-10-CM

## 2021-06-11 PROBLEM — I63.9 CEREBROVASCULAR ACCIDENT (CVA) DUE TO VASCULAR OCCLUSION (HCC): Status: ACTIVE | Noted: 2021-06-11

## 2021-06-11 PROCEDURE — 1111F DSCHRG MED/CURRENT MED MERGE: CPT | Performed by: SURGERY

## 2021-06-11 PROCEDURE — G8417 CALC BMI ABV UP PARAM F/U: HCPCS | Performed by: SURGERY

## 2021-06-11 PROCEDURE — G8427 DOCREV CUR MEDS BY ELIG CLIN: HCPCS | Performed by: SURGERY

## 2021-06-11 PROCEDURE — 99214 OFFICE O/P EST MOD 30 MIN: CPT | Performed by: SURGERY

## 2021-06-11 PROCEDURE — 4004F PT TOBACCO SCREEN RCVD TLK: CPT | Performed by: SURGERY

## 2021-06-11 ASSESSMENT — ENCOUNTER SYMPTOMS: COLOR CHANGE: 0

## 2021-06-11 NOTE — PROGRESS NOTES
Daily Progress Note   Yanira Mayer MD      6/11/2021    Chief Complaint   Patient presents with    Follow-Up from Hospital     Pt left AMA with [facility] 5/28 due to them wanting to do a stroke work up , pt mentioned she had a stroke before and didnt feel like this was a stroke, she was having blurred vision, having an anxiety attack, denies any dizziness, numbing/tingling in legs/feet, headaches on a regular. HISTORY OF PRESENT ILLNESS:                The patient is a 36 y.o. female who presents with a hospital follow up after a stroke. Ms. Joselito Vasquez went to [facility] for what she feared was a stroke while having blurred vision. At [facility] they wanted to do a stroke work up, but she became anxious and signed out AMA. She then spoke with someone at the office here and came to the ED for a work up. She has hx of stroke. She says at that time she had her vision go black of the left eye, and some weakness of the right side. This time she had blurry vision, and what she calls \"sparkles\" in her peripheral vision. Her most recent MRI shows a small stroke on the left. She also has some vessel disease of her legs, as seen by the ABIs done 3/22/21. She is still smoking. Ms. Joselito Vasquez says she has a brother a year younger than her and he has already had two lower extremity bypass operations. Her mother has PAD.            Past Medical History:   Diagnosis Date    Anxiety     Carotid stenosis 12/2/2015    Cerebral artery occlusion with cerebral infarction ([address]     Graves disease     Hyperlipidemia     Hyperlipidemia     Hypertension     Intermittent claudication of both lower extremities due to atherosclerosis ([address] 7/10/2020       Past Surgical History:   Procedure Laterality Date    DENTAL SURGERY      TONSILLECTOMY         Social History     Socioeconomic History    Marital status: Single     Spouse name: Not on file    Number of children: Not on file    Years of education: Not on file    Highest education level: Not on file   Occupational History    Not on file   Tobacco Use    Smoking status: Current Every Day Smoker     Packs/day: 1.00     Years: 15.00     Pack years: 15.00     Types: Cigarettes    Smokeless tobacco: Never Used    Tobacco comment: Quit smoking 7 months ago, but started up again   Substance and Sexual Activity    Alcohol use: No    Drug use: No    Sexual activity: Not on file   Other Topics Concern    Not on file   Social History Narrative    Not on file     Social Determinants of Health     Financial Resource Strain:     Difficulty of Paying Living Expenses:    Food Insecurity:     Worried About Running Out of Food in the Last Year:     Ran Out of Food in the Last Year:    Transportation Needs:     Lack of Transportation (Medical):      Lack of Transportation (Non-Medical):    Physical Activity:     Days of Exercise per Week:     Minutes of Exercise per Session:    Stress:     Feeling of Stress :    Social Connections:     Frequency of Communication with Friends and Family:     Frequency of Social Gatherings with Friends and Family:     Attends [employer]ational Services:     Active Member of Clubs or Organizations:     Attends Club or Organization Meetings:     Marital Status:    Intimate Partner Violence:     Fear of Current or Ex-Partner:     Emotionally Abused:     Physically Abused:     Sexually Abused:        Family History   Problem Relation Age of Onset    Arthritis Mother     High Blood Pressure Mother     High Cholesterol Mother     Cancer Maternal Uncle     Diabetes Paternal Aunt     Diabetes Paternal Uncle     Cancer Maternal Grandmother     Cancer Maternal Grandfather          Current Outpatient Medications:     aspirin (PAMELA ASPIRIN) 325 MG tablet, Take 1 tablet by mouth daily, Disp: 30 tablet, Rfl: 3    clopidogrel (PLAVIX) 75 MG tablet, Take 75 mg by mouth daily Start taking 5/30 for 3 weeks with asa 81 for DAPT then switch back to asa 325 daily until further recommendations. , Disp: , Rfl:     buPROPion (WELLBUTRIN XL) 150 MG extended release tablet, Take 150 mg by mouth every morning , Disp: , Rfl:     losartan (COZAAR) 50 MG tablet, Take 50 mg by mouth daily , Disp: , Rfl:     atorvastatin (LIPITOR) 80 MG tablet, Take 80 mg by mouth daily, Disp: , Rfl:     busPIRone (BUSPAR) 15 MG tablet, Take 15 mg by mouth nightly , Disp: , Rfl:     Morphine    Vitals:    06/11/21 1144   BP: 136/72   Weight: 274 lb (124.3 kg)       Admission on 05/28/2021, Discharged on 05/29/2021   Component Date Value Ref Range Status    WBC 05/29/2021 8.2  4.0 - 11.0 K/uL Final    RBC 05/29/2021 4.66  4.00 - 5.20 M/uL Final    Hemoglobin 05/29/2021 13.6  12.0 - 16.0 g/dL Final    Hematocrit 05/29/2021 41.7  36.0 - 48.0 % Final    MCV 05/29/2021 89.5  80.0 - 100.0 fL Final    MCH 05/29/2021 29.2  26.0 - 34.0 pg Final    MCHC 05/29/2021 32.6  31.0 - 36.0 g/dL Final    RDW 05/29/2021 17.4* 12.4 - 15.4 % Final    Platelets 76/22/3525 251  135 - 450 K/uL Final    MPV 05/29/2021 9.3  5.0 - 10.5 fL Final    Hemoglobin A1C 05/29/2021 5.8  See comment % Final    Comment: Comment:  Diagnosis of Diabetes: > or = 6.5%  Increased risk of diabetes (Prediabetes): 5.7-6.4%  Glycemic Control: Nonpregnant Adults: <7.0%                    Pregnant: <6.0%        eAG 05/29/2021 119.8  mg/dL Final    Sodium 05/29/2021 139  136 - 145 mmol/L Final    Potassium reflex Magnesium 05/29/2021 3.6  3.5 - 5.1 mmol/L Final    Chloride 05/29/2021 104  99 - 110 mmol/L Final    CO2 05/29/2021 24  21 - 32 mmol/L Final    Anion Gap 05/29/2021 11  3 - 16 Final    Glucose 05/29/2021 99  70 - 99 mg/dL Final    BUN 05/29/2021 7  7 - 20 mg/dL Final    CREATININE 05/29/2021 0.8  0.6 - 1.1 mg/dL Final    GFR Non- 05/29/2021 >60  >60 Final    Comment: >60 mL/min/1.73m2 EGFR, calc. for ages 25 and older using the  MDRD formula (not corrected for weight), is valid for DP: monophasic (strong)  Lt PT: biphasic  Lt AT: not checked      [facility] 3/22/2021  Right:  P.T.: 110 D. P.: 126 ARM BP:            P.I.: 0.74/0.85  Left:  P.T.: 104 D.P.: 112 ARM BP: 148    P. I.: 0.70/0.76    [facility] 7/9/2020  Right:  P.T.: 132 D.P.: 143 ARM BP: 176/71 P. I.: 0.75/0.81  Left:  P.T.: 130 D. P.: 108 ARM BP: 170/69 P. I.: 0.73/0.61  Pulmonary:      Effort: No respiratory distress. Breath sounds: No decreased breath sounds, wheezing or rales. Chest:      Chest wall: No tenderness. Musculoskeletal:         General: Normal range of motion. Cervical back: Normal range of motion and neck supple. Lumbar back: Normal. No bony tenderness. Right knee: No swelling or deformity. No tenderness. Left knee: No swelling or deformity. No tenderness. Right lower leg: Edema (nonpitting edema; right ankle measures 23.7 cm, calf 46.4 cm) present. Left lower leg: Edema (nonpitting edema; left ankle measures 23.8 cm, calf 46.2 cm) present. Skin:     General: Skin is warm and dry. Findings: No bruising or ecchymosis. Neurological:      Mental Status: She is alert and oriented to person, place, and time. Cranial Nerves: No cranial nerve deficit. Sensory: No sensory deficit. Coordination: Coordination normal.      Gait: Gait normal.   Psychiatric:         Speech: Speech normal.         Behavior: Behavior normal.         Thought Content: Thought content normal.         Judgment: Judgment normal.       Mrs. Laila August has hyperlipidemia and hypertension. She has a history of stroke.      ASSESSMENT:    Problem List Items Addressed This Visit     Vertebral artery stenosis, asymptomatic    Tobacco use    Occlusion of left carotid artery    Morbid obesity due to excess calories (HCC)    Intermittent claudication of both lower extremities due to atherosclerosis ([address]    Hyperlipidemia    Cerebrovascular accident (CVA) due to vascular occlusion ([address]    Carotid stenosis - Primary

## 2021-06-11 NOTE — PATIENT INSTRUCTIONS
Make an appointment to see your eye doctor about the floaters you are seeing as well as the sparkles. Reschedule your September appointments to December.

## 2021-07-01 NOTE — DISCHARGE SUMMARY
Hospital Medicine Discharge Summary    Patient: Naeem Vergara     Gender: female  : 1980   Age: 36 y.o. MRN: [id number]    Admitting Physician: Consuelo Piedra MD  Discharge Physician: Jennifer Hardin DO     Code Status: Prior     Admit Date: 2021   Discharge Date: 2021      Disposition: HOME    Discharge Diagnoses: Active Hospital Problems    Diagnosis Date Noted    Visual disturbance [H53.9] 2021       Follow-up appointments: F/U with PCP in 1 week. F/U with vascular surgeon in 2 weeks. F/U with neurology in 1-2 weeks    Outpatient to do list:     Condition at Discharge: fair    Hospital Course:   36year old female presents with blurry vision. MRI brain indicated acute/subacute infarct. Neurology was consulted and this was deemed incidental. She is to take dual antiplatelet therapy. CTA head and neck done which showed severe MCA stenosis. Vascular surgeon was consulted. She is to follow up as outpatient. Patient is medically stable for discharge. Discharge Medications:   Discharge Medication List as of 2021  5:01 PM      START taking these medications    Details   aspirin (PAMELA ASPIRIN) 325 MG tablet Take 1 tablet by mouth daily, Disp-30 tablet, R-3Print           Discharge Medication List as of 2021  5:01 PM        Discharge Medication List as of 2021  5:01 PM      CONTINUE these medications which have NOT CHANGED    Details   clopidogrel (PLAVIX) 75 MG tablet Take 75 mg by mouth daily Start taking 5/30 for 3 weeks with asa 81 for DAPT then switch back to asa 325 daily until further recommendations. Historical Med      buPROPion (WELLBUTRIN XL) 150 MG extended release tablet Take 150 mg by mouth every morning Historical Med      losartan (COZAAR) 50 MG tablet Take 50 mg by mouth daily Historical Med      atorvastatin (LIPITOR) 80 MG tablet Take 80 mg by mouth dailyHistorical Med      busPIRone (BUSPAR) 15 MG tablet Take 15 mg by mouth nightly Historical Med Discharge Medication List as of 5/29/2021  5:01 PM      STOP taking these medications       PRENATAL VIT-FE FUMARATE-FA PO Comments:   Reason for Stopping:         ASPIRIN LOW DOSE 81 MG EC tablet Comments:   Reason for Stopping:         cyclobenzaprine (FLEXERIL) 5 MG tablet Comments:   Reason for Stopping:         ibuprofen (ADVIL;MOTRIN) 600 MG tablet Comments:   Reason for Stopping:         labetalol (NORMODYNE) 100 MG tablet Comments:   Reason for Stopping:               Discharge ROS:  A complete review of systems was asked and negative     Discharge Exam:    /76   Pulse 85   Temp 98.5 °F (36.9 °C) (Oral)   Resp 16   Ht 5' 4\" (1.626 m)   Wt 276 lb 14.4 oz (125.6 kg)   LMP 05/28/2021   SpO2 95%   BMI 47.53 kg/m²   General appearance:  NAD  HEENT:   Normal cephalic, atraumatic, moist mucous membranes, no oropharyngeal erythema or exudate  Neck: Supple, trachea midline, no anterior cervical or SC LAD  Heart[de-identified] Normal s1/s2, RRR, no murmurs, gallops, or rubs. no leg edema  Lungs:  no bilaterally, no wheeze, no rales or rhonchi, no use of accessory musclesNormal respiratory effort. Clear to auscultation, bilaterally without Rales/Wheezes/Rhonchi. Abdomen: Soft, non-tender, non-distended, bowel sounds present, no masses  Musculoskeletal:  No clubbing, no cyanosis, no edema  Skin: No lesion or masses  Neurologic:  Neurovascularly intact without any focal sensory/motor deficits. Cranial nerves: II-XII intact, grossly non-focal.  Psychiatric:  Normal mood and affect  Neuro: Grossly intact, moves all four extremities     Labs:  For convenience and continuity at follow-up the following most recent labs are provided:    Lab Results   Component Value Date    WBC 8.2 05/29/2021    HGB 13.6 05/29/2021    HCT 41.7 05/29/2021    MCV 89.5 05/29/2021     05/29/2021     05/29/2021    K 3.6 05/29/2021     05/29/2021    CO2 24 05/29/2021    BUN 7 05/29/2021    CREATININE 0.8 05/29/2021 CALCIUM 8.4 05/29/2021    ALKPHOS 125 10/26/2020    ALT 22 10/26/2020    AST 18 10/26/2020    BILITOT 0.3 10/26/2020    LABALBU 4.3 10/26/2020    LDLCALC 114 05/29/2021    TRIG 143 05/29/2021     Lab Results   Component Value Date    INR 1.00 12/01/2015       Radiology:  No results found. The patient was seen and examined on day of discharge and this discharge summary is in conjunction with any daily progress note from day of discharge. Time Spent on discharge is 45 min  in the examination, evaluation, counseling and review of medications and discharge plan. Note that more than 30 minutes was spent in preparing discharge papers, discussing discharge with patient, medication review, etc.       Signed:    Jennifer Hardin DO   7/1/2021      Thank you No primary care provider on file. for the opportunity to be involved in this patient's care.  If you have any questions or concerns please feel free to contact me

## 2021-07-22 ENCOUNTER — TELEPHONE (OUTPATIENT)
Dept: SURGERY | Age: 41
End: 2021-07-22

## 2021-07-22 ENCOUNTER — HOSPITAL ENCOUNTER (EMERGENCY)
Age: 41
Discharge: HOME | End: 2021-07-22
Attending: EMERGENCY MEDICINE
Payer: COMMERCIAL

## 2021-07-22 VITALS
WEIGHT: 291.45 LBS | HEART RATE: 70 BPM | TEMPERATURE: 98.4 F | RESPIRATION RATE: 19 BRPM | BODY MASS INDEX: 50.03 KG/M2 | DIASTOLIC BLOOD PRESSURE: 66 MMHG | SYSTOLIC BLOOD PRESSURE: 125 MMHG | OXYGEN SATURATION: 100 %

## 2021-07-22 DIAGNOSIS — R51.9 ACUTE NONINTRACTABLE HEADACHE, UNSPECIFIED HEADACHE TYPE: Primary | ICD-10-CM

## 2021-07-22 LAB
A/G RATIO: 1.1 (ref 1.1–2.2)
ALBUMIN SERPL-MCNC: 3.8 G/DL (ref 3.4–5)
ALP BLD-CCNC: 103 U/L (ref 40–129)
ALT SERPL-CCNC: 13 U/L (ref 10–40)
ANION GAP SERPL CALCULATED.3IONS-SCNC: 8 MMOL/L (ref 3–16)
AST SERPL-CCNC: 11 U/L (ref 15–37)
BASOPHILS ABSOLUTE: 0.1 K/UL (ref 0–0.2)
BASOPHILS RELATIVE PERCENT: 0.9 %
BILIRUB SERPL-MCNC: 0.3 MG/DL (ref 0–1)
BILIRUBIN URINE: NEGATIVE
BLOOD, URINE: ABNORMAL
BUN BLDV-MCNC: 10 MG/DL (ref 7–20)
CALCIUM SERPL-MCNC: 9.1 MG/DL (ref 8.3–10.6)
CHLORIDE BLD-SCNC: 101 MMOL/L (ref 99–110)
CLARITY: CLEAR
CO2: 27 MMOL/L (ref 21–32)
COLOR: YELLOW
CREAT SERPL-MCNC: 0.9 MG/DL (ref 0.6–1.1)
EKG ATRIAL RATE: 68 BPM
EKG DIAGNOSIS: NORMAL
EKG P AXIS: 10 DEGREES
EKG P-R INTERVAL: 142 MS
EKG Q-T INTERVAL: 422 MS
EKG QRS DURATION: 76 MS
EKG QTC CALCULATION (BAZETT): 448 MS
EKG R AXIS: 1 DEGREES
EKG T AXIS: 29 DEGREES
EKG VENTRICULAR RATE: 68 BPM
EOSINOPHILS ABSOLUTE: 0.3 K/UL (ref 0–0.6)
EOSINOPHILS RELATIVE PERCENT: 3.6 %
EPITHELIAL CELLS, UA: 1 /HPF (ref 0–5)
GFR AFRICAN AMERICAN: >60
GFR NON-AFRICAN AMERICAN: >60
GLOBULIN: 3.4 G/DL
GLUCOSE BLD-MCNC: 137 MG/DL (ref 70–99)
GLUCOSE BLD-MCNC: 144 MG/DL (ref 70–99)
GLUCOSE URINE: NEGATIVE MG/DL
HCG(URINE) PREGNANCY TEST: NEGATIVE
HCT VFR BLD CALC: 42.4 % (ref 36–48)
HEMOGLOBIN: 14.2 G/DL (ref 12–16)
HYALINE CASTS: 1 /LPF (ref 0–8)
KETONES, URINE: NEGATIVE MG/DL
LEUKOCYTE ESTERASE, URINE: NEGATIVE
LYMPHOCYTES ABSOLUTE: 1.8 K/UL (ref 1–5.1)
LYMPHOCYTES RELATIVE PERCENT: 25.6 %
MCH RBC QN AUTO: 30.2 PG (ref 26–34)
MCHC RBC AUTO-ENTMCNC: 33.6 G/DL (ref 31–36)
MCV RBC AUTO: 90 FL (ref 80–100)
MICROSCOPIC EXAMINATION: YES
MONOCYTES ABSOLUTE: 0.4 K/UL (ref 0–1.3)
MONOCYTES RELATIVE PERCENT: 5.2 %
NEUTROPHILS ABSOLUTE: 4.7 K/UL (ref 1.7–7.7)
NEUTROPHILS RELATIVE PERCENT: 64.7 %
NITRITE, URINE: NEGATIVE
PDW BLD-RTO: 16.6 % (ref 12.4–15.4)
PERFORMED ON: ABNORMAL
PH UA: 7 (ref 5–8)
PLATELET # BLD: 254 K/UL (ref 135–450)
PMV BLD AUTO: 8.5 FL (ref 5–10.5)
POTASSIUM REFLEX MAGNESIUM: 4.1 MMOL/L (ref 3.5–5.1)
PROTEIN UA: NEGATIVE MG/DL
RBC # BLD: 4.71 M/UL (ref 4–5.2)
RBC UA: 37 /HPF (ref 0–4)
SODIUM BLD-SCNC: 136 MMOL/L (ref 136–145)
SPECIFIC GRAVITY UA: 1.01 (ref 1–1.03)
TOTAL PROTEIN: 7.2 G/DL (ref 6.4–8.2)
URINE REFLEX TO CULTURE: ABNORMAL
URINE TYPE: ABNORMAL
UROBILINOGEN, URINE: 0.2 E.U./DL
WBC # BLD: 7.2 K/UL (ref 4–11)
WBC UA: 2 /HPF (ref 0–5)

## 2021-07-22 PROCEDURE — 84703 CHORIONIC GONADOTROPIN ASSAY: CPT

## 2021-07-22 PROCEDURE — 80053 COMPREHEN METABOLIC PANEL: CPT

## 2021-07-22 PROCEDURE — 93010 ELECTROCARDIOGRAM REPORT: CPT | Performed by: INTERNAL MEDICINE

## 2021-07-22 PROCEDURE — 81001 URINALYSIS AUTO W/SCOPE: CPT

## 2021-07-22 PROCEDURE — 99285 EMERGENCY DEPT VISIT HI MDM: CPT

## 2021-07-22 PROCEDURE — 6370000000 HC RX 637 (ALT 250 FOR IP): Performed by: EMERGENCY MEDICINE

## 2021-07-22 PROCEDURE — 85025 COMPLETE CBC W/AUTO DIFF WBC: CPT

## 2021-07-22 PROCEDURE — 93005 ELECTROCARDIOGRAM TRACING: CPT | Performed by: EMERGENCY MEDICINE

## 2021-07-22 RX ORDER — ACETAMINOPHEN 500 MG
1000 TABLET ORAL ONCE
Status: COMPLETED | OUTPATIENT
Start: 2021-07-22 | End: 2021-07-22

## 2021-07-22 RX ADMIN — ACETAMINOPHEN 1000 MG: 500 TABLET ORAL at 13:31

## 2021-07-22 ASSESSMENT — PAIN SCALES - GENERAL: PAINLEVEL_OUTOF10: 5

## 2021-07-22 NOTE — ED NOTES
Pt arrived to the ED via EMS, pt states that she was washing dishes and started to have a headache, pt also states that she \" felt like passing out\"  Upon arrival to the ED pt denies pain, alert and orient, vss afebrile      Kayleigh Herbert RN  07/22/21 1500

## 2021-07-22 NOTE — ED NOTES
Pt resting in bed at this time. Call light remains in reach instructed pt how to use, and encouraged pt to call if needed assistance, no distress noted. RR even and unlabored, skin warm and dry. No needs at this time. Will continue to monitor closely.          Des Mcnair RN  07/22/21 3569

## 2021-07-22 NOTE — ED PROVIDER NOTES
CHIEF COMPLAINT  Headache (pt started getting a headache while doing dishes) and Anxiety (pt stated \" i'm afraid of having a stroke and think i'm going to have one\" pt appears to be nervous/anxious at arrival )      HISTORY OF PRESENT ILLNESS  Mouna King is a 36 y.o. female who  has a past medical history of Anxiety, Carotid stenosis, Cerebral artery occlusion with cerebral infarction ([address] Graves disease, Hyperlipidemia, Hyperlipidemia, Hypertension, and Intermittent claudication of both lower extremities due to atherosclerosis ([address] presents to the ED complaining of headache that started at approximately 11 AM this morning when she was standing doing dishes. Headache is frontal bilaterally nonradiating. No thunderclap onset. Not the worst headache of her life. No associated neck pain or stiffness. Patient states that she had breakfast and started to develop a headache when she was doing her dishes. States that she also had a little bit of blurry vision and this made her anxious. Denies any blurry vision currently. Denies any vision changes at all at this time. Denies any numbness or weakness. No neck pain or stiffness. Denies any difficulty speaking. Patient states she is afraid of having a stroke and therefore called EMS who brought her to the emergency room for evaluation. Patient states that she does have previous history of TIA with no residual deficits. Denies any fevers or chills. No chest pain or shortness of breath. No nausea or vomiting. Normal urinary and bowel habits. States she currently is on her menstrual cycle. No other complaints, modifying factors or associated symptoms. Nursing notes reviewed.    Past Medical History:   Diagnosis Date    Anxiety     Carotid stenosis 12/2/2015    Cerebral artery occlusion with cerebral infarction ([address]     Graves disease     Hyperlipidemia     Hyperlipidemia     Hypertension     [facility]term[facility] both [facility] 7/10/2020     Past Surgical History:   Procedure Laterality Date    DENTAL SURGERY      TONSILLECTOMY       Family History   Problem Relation Age of Onset    Arthritis Mother     High Blood Pressure Mother     High Cholesterol Mother     Cancer Maternal Uncle     Diabetes Paternal Aunt     Diabetes Paternal Uncle     Cancer Maternal Grandmother     Cancer Maternal Grandfather      Social History     Socioeconomic History    Marital status: Single     Spouse name: Not on file    Number of children: Not on file    Years of education: Not on file    Highest education level: Not on file   Occupational History    Not on file   Tobacco Use    Smoking status: Current Every Day Smoker     Packs/day: 1.00     Years: 15.00     Pack years: 15.00     Types: Cigarettes    Smokeless tobacco: Never Used    Tobacco comment: Quit smoking 7 months ago, but started up again   Substance and Sexual Activity    Alcohol use: No    Drug use: No    Sexual activity: Not on file   Other Topics Concern    Not on file   Social History Narrative    Not on file     Social Determinants of Health     Financial Resource Strain:     Difficulty of Paying Living Expenses:    Food Insecurity:     Worried About Running Out of Food in the Last Year:     Ran Out of Food in the Last Year:    Transportation Needs:     Lack of Transportation (Medical):      Lack of Transportation (Non-Medical):    Physical Activity:     Days of Exercise per Week:     Minutes of Exercise per Session:    Stress:     Feeling of Stress :    Social Connections:     Frequency of Communication with Friends and Family:     Frequency of Social Gatherings with Friends and Family:     Attends [employer] Services:     Active Member of Clubs or Organizations:     Attends Club or Organization Meetings:     Marital Status:    Intimate Partner Violence:     Fear of Current or Ex-Partner:     Emotionally Abused:  Physically Abused:     Sexually Abused:      No current facility-administered medications for this encounter. Current Outpatient Medications   Medication Sig Dispense Refill    aspirin (PAMELA ASPIRIN) 325 MG tablet Take 1 tablet by mouth daily 30 tablet 3    clopidogrel (PLAVIX) 75 MG tablet Take 75 mg by mouth daily Start taking 5/30 for 3 weeks with asa 81 for DAPT then switch back to asa 325 daily until further recommendations.  buPROPion (WELLBUTRIN XL) 150 MG extended release tablet Take 150 mg by mouth every morning       losartan (COZAAR) 50 MG tablet Take 50 mg by mouth daily       atorvastatin (LIPITOR) 80 MG tablet Take 80 mg by mouth daily      busPIRone (BUSPAR) 15 MG tablet Take 15 mg by mouth nightly        Allergies   Allergen Reactions    Morphine Nausea Only         REVIEW OF SYSTEMS  10 systems reviewed, pertinent positives per HPI otherwise noted to be negative    PHYSICAL EXAM  /66   Pulse 70   Temp 98.6 °F (37 °C) (Oral)   Resp 19   Wt 291 lb 7.2 oz (132.2 kg)   SpO2 100%   BMI 50.03 kg/m²      CONSTITUTIONAL: AOx4, cooperative with exam, afebrile   HEAD: normocephalic, atraumatic   EYES: PERRL, EOMI, anicteric sclera   ENT: Moist mucous membranes, uvula midline, TMs normal bilaterally   NECK: Supple, symmetric, trachea midline, no meningismus, full range of motion without pain   LUNGS: Bilateral breath sounds, CTAB, no rales/ronchi/wheezes   CARDIOVASCULAR: RRR, normal S1/S2, no m/r/g, 2+ pulses throughout   ABDOMEN: Soft, non-tender, non-distended, +BS   NEUROLOGIC:  MAEx4, 5/5 strength throughout; fine touch sensation intact throughout; normal gait; finger-to-nose testing normal; normal heel-to-shin, GCS 15, cranial nerves II through XII intact, no dysarthria, no aphasia   MUSCULOSKELETAL: No clubbing, cyanosis or edema   SKIN: No rash, pallor or wounds on exposed surfaces         RADIOLOGY  X-RAYS:  I have reviewed radiologic plain film image(s).   ALL OTHER NON-PLAIN FILM IMAGES SUCH AS CT, ULTRASOUND AND MRI HAVE BEEN READ BY THE RADIOLOGIST. No orders to display          EKG INTERPRETATION  Normal sinus rhythm with a rate of 68, normal axis, , QRS 76, QTc 448, no ST elevations, nonspecific ST changes, low voltage EKG, poor R wave progression, no acute change compared EKG from 10/1/2025 except for decrease in rate by 16 bpm    PROCEDURES    ED COURSE/MDM  Tension headache, cluster headache, migraine headache, anxiety attack, panic attack  Patient seen and evaluated. History and physical as above. Nontoxic, afebrile. Patient presents complaining of a headache is frontal bilaterally nonradiating. No meningismus. No thunderclap onset. NIH of 0. Will obtain routine labs, EKG, point-of-care glucose, urinalysis and urine pregnancy. Tylenol for headache. ED Course as of Jul 22 1526   Thu Jul 22, 2021   1457 Patient reassessed. Patient states she is feeling much better after the ibuprofen. Patient's blood pressure has come down since being in the emergency room. Patient denies any complaints on reassessment. Awaiting results of urinalysis and urine pregnancy. [DS]   1524 Urinalysis negative for infection. There is blood in the urine but patient is currently on her menstrual cycle. Pregnancy negative. Patient updated on results. Plan for discharge with outpatient follow-up. Patient agreeable. Return instruction provided. All questions answered prior to discharge. [DS]      ED Course User Index  [DS] Rina Strange MD       I estimate there is LOW risk for SUBARACHNOID HEMORRHAGE, MENINGITIS, INTRACRANIAL HEMORRHAGE, SUBDURAL HEMATOMA, OR STROKE, thus I consider the discharge disposition reasonable. Mouna King and I have discussed the diagnosis and risks, and we agree with discharging home to follow-up with their primary doctor. We also discussed returning to the Emergency Department immediately if new or worsening symptoms occur.  We have discussed the symptoms which are most concerning (e.g., changing or worsening pain, weakness, vomiting, fever) that necessitate immediate return. Patient was given scripts for the following medications. I counseled patient how to take these medications. New Prescriptions    No medications on file           CLINICAL IMPRESSION  1. Acute nonintractable headache, unspecified headache type        Blood pressure 125/66, pulse 70, temperature 98.6 °F (37 °C), temperature source Oral, resp. rate 19, weight 291 lb 7.2 oz (132.2 kg), SpO2 100 %. DISPOSITION  Patient was discharged to home in good condition. [facility]  [phone]  Call today  For a follow up appointment. Disclaimer: All medical record entries made by [employer] dictation.       (Please note that this note was completed with a voice recognition program. Every attempt was made to edit the dictations, but inevitably there remain words that are mis-transcribed.)            Sera Guzman MD  07/22/21 1166

## 2021-07-22 NOTE — TELEPHONE ENCOUNTER
Pt is concerned that there are changes, she was seen at the ED yesterday and they didn't check her carotid, she is very concerned. Please advise.

## 2021-07-26 NOTE — TELEPHONE ENCOUNTER
You saw this patient on 6/11/21-    She says she had blurred vision, black dots and sparkles. She said she was diagnosed as having headache and anxiety. She said she does have an eye specialist appointment on 8/16/21. She has a scan and office visit with Karla Farias on 8/16/21 as well. Anything she should do in the meantime?

## 2021-07-28 NOTE — TELEPHONE ENCOUNTER
As per Dr. Mirna Tran is doing what she is supposed to be doing. She did not need another scan. Talked with patient to give her message.

## 2021-08-16 ENCOUNTER — OFFICE VISIT (OUTPATIENT)
Dept: BARIATRICS/WEIGHT MGMT | Age: 41
End: 2021-08-16

## 2021-08-16 VITALS
BODY MASS INDEX: 49.43 KG/M2 | HEART RATE: 60 BPM | WEIGHT: 279 LBS | HEIGHT: 63 IN | SYSTOLIC BLOOD PRESSURE: 107 MMHG | DIASTOLIC BLOOD PRESSURE: 79 MMHG

## 2021-08-16 DIAGNOSIS — I10 HYPERTENSION, ESSENTIAL: ICD-10-CM

## 2021-08-16 DIAGNOSIS — E66.01 MORBID OBESITY WITH BMI OF 50.0-59.9, ADULT (HCC): ICD-10-CM

## 2021-08-16 PROCEDURE — 99999 PR OFFICE/OUTPT VISIT,PROCEDURE ONLY: CPT | Performed by: FAMILY MEDICINE

## 2021-08-16 NOTE — PROGRESS NOTES
Jennifer Matthews is a 36 y.o. female with a date of birth of 1980. Vitals:    08/16/21 1054   BP: 107/79   Pulse: 60   Weight: 279 lb (126.6 kg)   Height: 5' 3\" (1.6 m)    BMI: Body mass index is 49.42 kg/m². Obesity Classification: Class III    Weight History: Wt Readings from Last 3 Encounters:   08/16/21 279 lb (126.6 kg)   07/22/21 291 lb 7.2 oz (132.2 kg)   06/11/21 274 lb (124.3 kg)       Patient's lowest adult weight was 160 lb at age 16. Patient's highest adult weight was 279 lb at age 36. Patient has participated in the following weight loss programs: calorie restriction and hydroxycut. Patient has not participated in meal replacement/liquid diets. Patient has not participated in weight loss medications. Patient is not lactose intolerant. Patient does not have Synagogue/cultural food concerns. Patient does not have food allergies. Pt does tolerate artificial sweetener. Pt has poor sleep schedule d/t to small kids at home. Typically eating 2x day. 24 hour recall/food frequency chart:  Breakfast: sometimes - 2 eggs + 1 sausage & cheese / honey bun / donuts  Snack: sometimes - eats if missed breakfast - crackers  Lunch: sometimes - frozen pizza / deli sandwich and sometimes  Snack: not often - oreos  Dinner: yes. - tacos / spaghetti / protein + man n cheese / mashed potatoes  Snack: no.   Drinks throughout the day: 4-5 cans Mtn Dew daily / sometimes DD coffee with cream, sugar & arcadio / strawberry lemondae  Do you drink alcohol? no.     Patient does not meet the criteria for binge eating disorder. Patient sometimes have grazing. Patient does not have night eating. Patient does have a history of emotional eating or eating out of boredom.       Goals  Weight: 180#  Health Improvement: improved BP, cholesterol, improved sciatica    Assessment  Nutritional Needs: RMR=(9.99 x 126.6) + (6.25 x 160) - (4.92 x 40 y.o.) -161  = 1905 kcal x 1.4 (light / sedentary activity factor)= 2667 kcal - 1000 (for 2 lb weight loss/week)= 1667 kcal.    Plan  Plan/Recommendations: 1500kcal LCMP  Optifast:  Not interested  Diet Medications:  Is interested    Pt may be interested in sx wt loss as well. Handouts: 1500kcal LCMP, 9\" plate, Protein shake / bar    PES Statement:  Overweight/Obesity related to increased caloric intake and decreased physical activity as evidenced by BMI. Body mass index is 49.42 kg/m². Will follow up as necessary.     Felisa Wilhelm, JYOTI, LD

## 2021-08-21 ENCOUNTER — TELEMEDICINE (OUTPATIENT)
Dept: BARIATRICS/WEIGHT MGMT | Age: 41
End: 2021-08-21
Payer: COMMERCIAL

## 2021-08-21 DIAGNOSIS — E66.01 MORBID OBESITY WITH BMI OF 50.0-59.9, ADULT (HCC): Primary | ICD-10-CM

## 2021-08-21 DIAGNOSIS — Z71.3 DIETARY COUNSELING AND SURVEILLANCE: ICD-10-CM

## 2021-08-21 PROCEDURE — 99204 OFFICE O/P NEW MOD 45 MIN: CPT | Performed by: FAMILY MEDICINE

## 2021-08-21 PROCEDURE — G8427 DOCREV CUR MEDS BY ELIG CLIN: HCPCS | Performed by: FAMILY MEDICINE

## 2021-08-21 ASSESSMENT — ENCOUNTER SYMPTOMS
WHEEZING: 0
ABDOMINAL DISTENTION: 0
DIARRHEA: 0
EYE PAIN: 0
NAUSEA: 0
CONSTIPATION: 0
APNEA: 0
VOMITING: 0
CHEST TIGHTNESS: 0
COUGH: 0
ABDOMINAL PAIN: 0
SHORTNESS OF BREATH: 0
PHOTOPHOBIA: 0
CHOKING: 0
BLOOD IN STOOL: 0

## 2021-08-21 NOTE — PROGRESS NOTES
Patient: Jacki Kitchen     Encounter Date: 8/21/2021    YOB: 1980               Age: 36 y.o. Patient identification was verified at the start of the visit. No flowsheet data found. BP Readings from Last 1 Encounters:   08/16/21 107/79       BMI Readings from Last 1 Encounters:   08/16/21 49.42 kg/m²       Pulse Readings from Last 1 Encounters:   08/16/21 60                                               Chief Complaint   Patient presents with    Bariatric, Initial Visit     MWM       Wt Readings from Last 3 Encounters:   08/16/21 279 lb (126.6 kg)   07/22/21 291 lb 7.2 oz (132.2 kg)   06/11/21 274 lb (124.3 kg)       HPI:    36 y.o. female presents to [facility] care via video visit. She was referred by Dr. Daija Mcdonald for weight management. The patient's medical history is significant for class III obesity. The patient has a long-standing history of obesity which started gradually. The problem is severe. The patient has been gaining weight. Risk factors include annual weight gain of >2 lbs (1 kg)/ year and sedentary lifestyle. Aggravating factors include poor diet and lack of physical activity. The patient has tried various diet/exercise plans which have been ineffective in the long-run. Motivated to start losing weight to help improve overall health. Interested in aom  Not taking bupropion      When did you become overweight? [] Childhood   [x] Teens   [] Adulthood   [] Pregnancy   [] Menopause    Highest adult weight: 279 pounds at age 36    Triggers for weight gain? [] Stress   [] Illness   [] Medications   [] Travel  []Injury     [] Nightshift work   [] Insomnia  [x] No specific triggers   [] Other    Food triggers:   [x] Stress   [x] Boredom   [x] Fast food   [x] Eating out   [] Seeking reward   [] Social     Have you ever taken medications to help you lose weight?    [] Yes  [x] No    Have you ever been on a meal replacement program?  [] Yes  [x] No    How many hours of sleep do you get each night?  [] <6 hours  [x] 6-8 hours  [] >8 hours           Dietitian's assessment reviewed and addressed with patient. Reviewed:  [x] Nutrition and the importance of regular protein intake  [x] Hidden CHO/carbohydrate sources  [x] Alcohol use  [x] Tobacco use  [x] Drug use- Denies   [x] Importance of exercise and reducing sedentary time        Controlled Substance Monitoring:     No flowsheet data found.      Allergies   Allergen Reactions    Morphine Nausea Only         Current Outpatient Medications:     aspirin (PAMELA ASPIRIN) 325 MG tablet, Take 1 tablet by mouth daily, Disp: 30 tablet, Rfl: 3    buPROPion (WELLBUTRIN XL) 150 MG extended release tablet, Take 150 mg by mouth every morning  (Patient not taking: Reported on 8/16/2021), Disp: , Rfl:     losartan (COZAAR) 50 MG tablet, Take 50 mg by mouth daily , Disp: , Rfl:     atorvastatin (LIPITOR) 80 MG tablet, Take 80 mg by mouth daily, Disp: , Rfl:     busPIRone (BUSPAR) 15 MG tablet, Take 15 mg by mouth nightly , Disp: , Rfl:     Patient Active Problem List   Diagnosis    Carotid stenosis    Occlusion of left carotid artery    Vertebral artery stenosis, asymptomatic    Tobacco use    Hyperlipidemia    Essential hypertension    Family history of atherosclerosis    Morbid obesity due to excess calories (HCC)    Intermittent claudication of both lower extremities due to atherosclerosis (HCC)    Leg swelling    Visual disturbance    Cerebrovascular accident (CVA) due to vascular occlusion ([facility] [address] [facility]    Morbid obesity with BMI of 50.0-59.9, adult ([facility] [facility]    Hypertension, essential    Stroke [facility])       Past Medical History:   Diagnosis Date    Anxiety     Carotid stenosis 12/2/2015    Cerebral artery occlusion with cerebral infarction ([facility] [address] [facility]     Depression     Graves disease     Hyperlipidemia     Hyperlipidemia     Hypertension     Hypertension, essential     Intermittent claudication of both lower extremities due to valid for patients 18 years and older.       Calcium 07/22/2021 9.1  8.3 - 10.6 mg/dL Final    Total Protein 07/22/2021 7.2  6.4 - 8.2 g/dL Final    Albumin 07/22/2021 3.8  3.4 - 5.0 g/dL Final    Albumin/Globulin Ratio 07/22/2021 1.1  1.1 - 2.2 Final    Total Bilirubin 07/22/2021 0.3  0.0 - 1.0 mg/dL Final    Alkaline Phosphatase 07/22/2021 103  40 - 129 U/L Final    ALT 07/22/2021 13  10 - 40 U/L Final    AST 07/22/2021 11* 15 - 37 U/L Final    Globulin 07/22/2021 3.4  g/dL Final    Ventricular Rate 07/22/2021 68  BPM Final    Atrial Rate 07/22/2021 68  BPM Final    P-R Interval 07/22/2021 142  ms Final    QRS Duration 07/22/2021 76  ms Final    Q-T Interval 07/22/2021 422  ms Final    QTc Calculation (Bazett) 07/22/2021 448  ms Final    P Axis 07/22/2021 10  degrees Final    R Axis 07/22/2021 1  degrees Final    T Axis 07/22/2021 29  degrees Final    Diagnosis 07/22/2021 Normal sinus rhythm with wandering baselineLow voltage QRSAbnormal ECGWhen compared with ECG of 01-DEC-2015 18:41,QRS voltage has decreasedConfirmed by [facility] ([id number]) on 7/22/2021 9:09:30 PM   Final    Color, UA 07/22/2021 YELLOW  Straw/Yellow Final    Clarity, UA 07/22/2021 Clear  Clear Final    Glucose, Ur 07/22/2021 Negative  Negative mg/dL Final    Bilirubin Urine 07/22/2021 Negative  Negative Final    Ketones, Urine 07/22/2021 Negative  Negative mg/dL Final    Specific Gravity, UA 07/22/2021 1.013  1.005 - 1.030 Final    Blood, Urine 07/22/2021 LARGE* Negative Final    pH, UA 07/22/2021 7.0  5.0 - 8.0 Final    Protein, UA 07/22/2021 Negative  Negative mg/dL Final    Urobilinogen, Urine 07/22/2021 0.2  <2.0 E.U./dL Final    Nitrite, Urine 07/22/2021 Negative  Negative Final    Leukocyte Esterase, Urine 07/22/2021 Negative  Negative Final    Microscopic Examination 07/22/2021 YES   Final    Urine Type 07/22/2021 NotGiven   Final    Urine Reflex to Culture 07/22/2021 Not Indicated   Final    HCG(Urine) Pregnancy Test 07/22/2021 Negative  Detects HCG level >20 MIU/mL Final    Comment: Note:  Always repeat results in question with a serum  quantitative pregnancy test. A serum hCG is positive  2-5 days before the urine hCG test.      POC Glucose 07/22/2021 144* 70 - 99 mg/dl Final    Performed on 07/22/2021 ACCU-CHEK   Final    Hyaline Casts, UA 07/22/2021 1  0 - 8 /LPF Final    WBC, UA 07/22/2021 2  0 - 5 /HPF Final    RBC, UA 07/22/2021 37* 0 - 4 /HPF Final    Epithelial Cells, UA 07/22/2021 1  0 - 5 /HPF Final    Comment: Urinalysis microscopic performed using the  automated methodology (AUWI analyzer). Assessment and Plan:    1. Morbid obesity with BMI of 50.0-59.9, adult ([facility] [address]    Heavily counseled on the importance of therapeutic lifestyle changes through diet and exercise. The patient understands that the goal of treatment is to reach and stay at a healthy weight. The initial treatment goal is to lose at least 5-10% of her body weight in 12 weeks. This will require changes in eating habits, increased physical activity, and behavior changes. Counseled on low carb/amrik diet. Patient handouts and education material reviewed in detail with the patient. All questions answered. Encouraged patient to keep a food journal and to bring it to her next visit. Discussed available treatment options in addition to lifestyle changes including medications (Saxenda, Le mars or Contrave) or OPTIFAST. She is interested in anti-obesity medications. Treatment may be recommended at the next visit depending on her progress and lab results. she understands that medications are most effective as part of a comprehensive treatment plan that includes nutrition, physical activity, and behavior modification. The patient also understands that she will need close follow-ups every 2-4 weeks if she starts treatment.  Depending on the patient's success with these changes, she may also be a good candidate for bariatric surgery down the line. Follow-up in 2 weeks to discuss lab results and treatment plan. Patient advised that it is her responsibility to follow up on any ordered tests/lab results. Patient understands and agrees with the plan. - TSH with Reflex; Future  - Vitamin B12 & Folate; Future  - Vitamin D 25 Hydroxy; Future    2. Dietary counseling and surveillance    1500-Bart/low carb meal plan. Nutrition:  [] LCHF/Ketogenic [x] Low carb/low-calorie diet [] Low-calorie diet     []Maintenance        FITTE:   [] Cardio [] Resistance/stength exercises   [x] ACSM recommendations (150 minutes/week)           Behavior:   [x] Motivational interviewing performed    [] Referral for counseling  [x] Discussed strategies to overcome habits/challenges for focus      [x] Stress management   [x] Stimulus control  [x] Sleep hygiene      Orders Placed This Encounter   Procedures    TSH with Reflex     Standing Status:   Future     Standing Expiration Date:   8/21/2022    Vitamin B12 & Folate     Standing Status:   Future     Standing Expiration Date:   8/21/2022    Vitamin D 25 Hydroxy     Standing Status:   Future     Standing Expiration Date:   8/21/2022       No follow-ups on file. Daphney Freeman is a 36 y.o. female being evaluated by a Virtual Visit (video visit) encounter to address concerns as mentioned above. A caregiver was present when appropriate. Due to this being a TeleHealth encounter (During [facility]- public health emergency), evaluation of the following organ systems was limited: Vitals/Constitutional/EENT/Resp/CV/GI//MS/Neuro/Skin/Heme-Lymph-Imm. Pursuant to the emergency declaration under the [address] [place] [address], [address] [place] [address] authority and the [employer] and [employer] Act, this Virtual Visit was conducted with patient's (and/or legal guardian's) consent, to reduce the patient's risk of exposure to COVID-19 and provide necessary medical care.   The patient (and/or legal guardian) has also been advised to contact this office for worsening conditions or problems, and seek emergency medical treatment and/or call 911 if deemed necessary. Services were provided through a video synchronous discussion virtually to substitute for in-person clinic visit. Patient and provider were located at their individual homes. --Wes Jordan MD on 8/21/2021 at 2:15 PM    An electronic signature was used to authenticate this note. Greater than 50% of this 45 minute visit was used in direct counseling.

## 2021-08-23 ENCOUNTER — TELEPHONE (OUTPATIENT)
Dept: BARIATRICS/WEIGHT MGMT | Age: 41
End: 2021-08-23

## 2021-10-07 ENCOUNTER — TELEMEDICINE (OUTPATIENT)
Dept: BARIATRICS/WEIGHT MGMT | Age: 41
End: 2021-10-07
Payer: COMMERCIAL

## 2021-10-07 VITALS
BODY MASS INDEX: 50.32 KG/M2 | DIASTOLIC BLOOD PRESSURE: 79 MMHG | WEIGHT: 284 LBS | HEART RATE: 80 BPM | HEIGHT: 63 IN | SYSTOLIC BLOOD PRESSURE: 143 MMHG

## 2021-10-07 DIAGNOSIS — E53.8 LOW FOLATE: ICD-10-CM

## 2021-10-07 DIAGNOSIS — E66.01 MORBID OBESITY WITH BMI OF 50.0-59.9, ADULT (HCC): Primary | ICD-10-CM

## 2021-10-07 DIAGNOSIS — E55.9 VITAMIN D DEFICIENCY: ICD-10-CM

## 2021-10-07 DIAGNOSIS — Z71.3 DIETARY COUNSELING AND SURVEILLANCE: ICD-10-CM

## 2021-10-07 PROCEDURE — 99214 OFFICE O/P EST MOD 30 MIN: CPT | Performed by: FAMILY MEDICINE

## 2021-10-07 PROCEDURE — G8427 DOCREV CUR MEDS BY ELIG CLIN: HCPCS | Performed by: FAMILY MEDICINE

## 2021-10-07 RX ORDER — NALTREXONE HYDROCHLORIDE AND BUPROPION HYDROCHLORIDE 8; 90 MG/1; MG/1
TABLET, EXTENDED RELEASE ORAL
Qty: 120 TABLET | Refills: 0 | Status: SHIPPED | OUTPATIENT
Start: 2021-10-07

## 2021-10-07 RX ORDER — FOLIC ACID 1 MG/1
1 TABLET ORAL DAILY
Qty: 30 TABLET | Refills: 3 | Status: SHIPPED | OUTPATIENT
Start: 2021-10-07

## 2021-10-07 RX ORDER — ERGOCALCIFEROL 1.25 MG/1
50000 CAPSULE ORAL WEEKLY
Qty: 8 CAPSULE | Refills: 0 | Status: SHIPPED | OUTPATIENT
Start: 2021-10-07

## 2021-10-07 ASSESSMENT — ENCOUNTER SYMPTOMS
APNEA: 0
COUGH: 0
BLOOD IN STOOL: 0
VOMITING: 0
EYE PAIN: 0
ABDOMINAL DISTENTION: 0
ABDOMINAL PAIN: 0
SHORTNESS OF BREATH: 0
PHOTOPHOBIA: 0
CHEST TIGHTNESS: 0
CHOKING: 0
DIARRHEA: 0
WHEEZING: 0
CONSTIPATION: 0
NAUSEA: 0

## 2021-10-07 NOTE — PROGRESS NOTES
Patient: Staci Humphrey                      Encounter Date: 10/7/2021    YOB: 1980                Age: 39 y.o. Chief Complaint   Patient presents with    Weight Management     F/u MWM         Patient identification was verified at the start of the visit. Patient-Reported Vitals 10/7/2021   Patient-Reported Weight 180   Patient-Reported Height 5ft 4in   Patient-Reported Systolic 997   Patient-Reported Diastolic 79   Patient-Reported Pulse 80   Patient-Reported Temperature 99.1         BP Readings from Last 1 Encounters:   10/07/21 (!) 143/79       BMI Readings from Last 1 Encounters:   10/07/21 50.31 kg/m²       Pulse Readings from Last 1 Encounters:   10/07/21 80           Wt Readings from Last 3 Encounters:   10/07/21 284 lb (128.8 kg)   08/16/21 279 lb (126.6 kg)   07/22/21 291 lb 7.2 oz (132.2 kg)       Self-reported weight: 284 pounds     HPI: 39 y.o. female with a long-standing history of obesity presents today for virtual video follow-up. she has gained 5 pounds since her last visit in August. She hasn't started making any significant dietary changes. Continues to drink a few cans of [employer] each day     Labs reviewed completed    Vit D 10.8   Folate 2.53     Not on Wellbutrin         Allergies   Allergen Reactions    Morphine Nausea Only         Current Outpatient Medications:     naltrexone-buPROPion (CONTRAVE) 8-90 MG per extended release tablet, Start:1 tab po qam, then 1 tab po bid x 1 wk, then 2 tabs po qam and 1 tab po qpm x 1 wk;  Max 4 tabs/day, Disp: 120 tablet, Rfl: 0    vitamin D (ERGOCALCIFEROL) 1.25 MG (11959 UT) CAPS capsule, Take 1 capsule by mouth once a week, Disp: 8 capsule, Rfl: 0    folic acid (FOLVITE) 1 MG tablet, Take 1 tablet by mouth daily, Disp: 30 tablet, Rfl: 3    aspirin (PAMELA ASPIRIN) 325 MG tablet, Take 1 tablet by mouth daily, Disp: 30 tablet, Rfl: 3    losartan (COZAAR) 50 MG tablet, Take 50 mg by mouth daily , Disp: , Rfl:    atorvastatin (LIPITOR) 80 MG tablet, Take 80 mg by mouth daily, Disp: , Rfl:     busPIRone (BUSPAR) 15 MG tablet, Take 15 mg by mouth nightly , Disp: , Rfl:     Patient Active Problem List   Diagnosis    Carotid stenosis    Occlusion of left carotid artery    Vertebral artery stenosis, asymptomatic    Tobacco use    Hyperlipidemia    Essential hypertension    Family history of atherosclerosis    Morbid obesity due to excess calories (HCC)    Intermittent claudication of both lower extremities due to atherosclerosis (HCC)    Leg swelling    Visual disturbance    Cerebrovascular accident (CVA) due to vascular occlusion ([facility] [address]    Morbid obesity with BMI of 50.0-59.9, adult ([facility] [address]    Hypertension, essential    Stroke ([facility] [address]       Review of Systems   Constitutional: Negative for fatigue. Eyes: Negative for photophobia, pain and visual disturbance. Respiratory: Negative for apnea, cough, choking, chest tightness, shortness of breath and wheezing. Cardiovascular: Negative for chest pain, palpitations and leg swelling. Gastrointestinal: Negative for abdominal distention, abdominal pain, blood in stool, constipation, diarrhea, nausea and vomiting. Endocrine: Negative for cold intolerance and heat intolerance. Musculoskeletal: Negative for arthralgias and myalgias. Skin: Negative for rash. Neurological: Negative for dizziness, tremors, syncope, weakness, numbness and headaches. Psychiatric/Behavioral: Negative for agitation, confusion, decreased concentration, dysphoric mood, hallucinations, sleep disturbance and suicidal ideas. The patient is not nervous/anxious and is not hyperactive. Physical Exam  Constitutional:       Appearance: She is well-developed. HENT:      Head: Normocephalic. Eyes:      Conjunctiva/sclera: Conjunctivae normal.   Abdominal:      General: Abdomen is protuberant. Musculoskeletal:         General: No swelling.    Neurological:      Mental Status: She is alert and oriented to person, place, and time. Psychiatric:         Mood and Affect: Mood normal.         Behavior: Behavior normal.         Thought Content: Thought content normal.         Judgment: Judgment normal.         Orders Only on 09/25/2021   Component Date Value Ref Range Status    Sodium 09/25/2021 139  136 - 145 mmol/L Final    Potassium 09/25/2021 4.8  3.5 - 5.1 mmol/L Final    Chloride 09/25/2021 100  99 - 110 mmol/L Final    CO2 09/25/2021 24  21 - 32 mmol/L Final    Anion Gap 09/25/2021 15  3 - 16 Final    Glucose 09/25/2021 109* 70 - 99 mg/dL Final    BUN 09/25/2021 14  7 - 20 mg/dL Final    CREATININE 09/25/2021 0.9  0.6 - 1.1 mg/dL Final    GFR Non- 09/25/2021 >60  >60 Final    Comment: >60 mL/min/1.73m2 EGFR, calc. for ages 25 and older using the  MDRD formula (not corrected for weight), is valid for stable  renal function.  GFR  09/25/2021 >60  >60 Final    Comment: Chronic Kidney Disease: less than 60 ml/min/1.73 sq.m. Kidney Failure: less than 15 ml/min/1.73 sq.m. Results valid for patients 18 years and older.  Calcium 09/25/2021 9.3  8.3 - 10.6 mg/dL Final    Total Protein 09/25/2021 7.6  6.4 - 8.2 g/dL Final    Albumin 09/25/2021 4.2  3.4 - 5.0 g/dL Final    Albumin/Globulin Ratio 09/25/2021 1.2  1.1 - 2.2 Final    Total Bilirubin 09/25/2021 0.3  0.0 - 1.0 mg/dL Final    Alkaline Phosphatase 09/25/2021 128  40 - 129 U/L Final    ALT 09/25/2021 15  10 - 40 U/L Final    AST 09/25/2021 12* 15 - 37 U/L Final    Globulin 09/25/2021 3.4  g/dL Final         Assessment and Plan:  1. Morbid obesity with BMI of 50.0-59.9, adult ([facility] [address]  Discussed risks, benefits and alternatives of Contrave. Patient meets BMI criteria. she denies MAOI use within the past 14 days, is not on any opioids, and does not have a seizure disorder. Start Contrave 8/90 mg. Use as directed. F/u in 2 weeks before titrating up to 3 pills a day.      Explained to patient that I will monitor her weight loss every 12 weeks. Goal is to lose at least 5% of her body weight. Counseled patient on proper use and potential side effects including nausea/vomiting, constipation, headache, dizziness, insomnia, xerostomia, diarrhea, anxiety, increased blood pressure, flushing, fatigue, tremors, irritability, rash and/or palpitations. she understands that it is her responsibility to make sure that she does not run out of medications and to follow up to her appointments every 24 weeks as recommended. Heavily counseled on the importance of therapeutic lifestyle changes through diet and exercise. Patient is responsible for keeping his monthly appointments. Failure to comply with hermonthly visits will result in discontinuing the Contrave. Patient advised to report any side effects. 2. Dietary counseling and surveillance  1500-Bart/low carb meal plan. 3. Vitamin D deficiency  Start Vit D 50,000 IU weekly x 8 weeks. Check labs again in 3 months. 4. Low folate  Start folic acid 1 mg daily. Check labs again in 3 months.           Nutrition Plan: [] LCHF/Ketogenic   [x] Modified low-calorie diet (low carb/low-bart)               [] Low-calorie diet    [] Maintenance       []Other        Exercise: [x] Cardio     [x] Resistance/strength training                       [x] ACSM recommendations (150 minutes/week in active weight loss)               Behavior: [x] Motivational interviewing performed    [] Referralfor counseling                         [x] Discussed strategies to overcome habits/challenges for focus         [] Stress management   [x] Stimulus control         [] Sleep hygiene      Reviewed:  [x] Nutrition and the importance of regular protein intake  [x] Hidden carbohydrate sources  [x] Alcohol use  [x] Tobacco use   [x] Drug use- Denies  [x] Importance of exercise and reducing sedentary time  [x] Treatment consent- Patient understands and agrees with the treatment plan   [x] Proper use of medication and side effects  [x] Labs      Controlled Substance Monitoring:    No flowsheet data found. Patient denies any history of seizure disorder, MAOI use within the last 2 weeks, hyperthyroidism, glaucoma, agitated states, history of drug abuse, pregnancy, nursing, or known hypersensitivity to the prescribing meds. Treatment start date: 10/10/21  12 weeks from start of therapeutic dose: 2/10/22  Starting weight: 284 pounds    Goal: At least 14-12 pounds     Key dietary points:    - Meats (preferably organic or grass fed) are great sources of protein and have no carbohydrates. - Recommend coconut, olive, avocado, or almond oils. - When buying dairy, choose regular or full fat options. - Choose vegetables that grow above ground as they are generally lower in carbohydrates and higher in fiber.  - Avoid starches such as bread, rice, potatoes, pasta and all sources of simple sugars (desserts, soda, breakfast cereals). - Choose beverages that are calorie and sugar free. Reminder regarding weight loss medications: You must be seen in office every 2-4 weeks to haveyour prescriptions refilled. If you are off of your medication for longer than 7 days, you will not be able to restart the medication for at least 6 months. Always call our office to report any side effects. Females, it is your responsibility to obtain negative pregnancy tests each month. No orders of the defined types were placed in this encounter. No follow-ups on file. Yoav Perez is a 39 y.o. female being evaluated by a Virtual Visit (video visit) encounter to address concerns as mentioned above. A caregiver was present when appropriate. Due to this being a TeleHealth encounter (During [place] [address] public health emergency), evaluation of the following organ systems was limited: Vitals/Constitutional/EENT/Resp/CV/GI//MS/Neuro/Skin/Heme-Lymph-Imm.   Pursuant to the emergency declaration under the [facility], [address] [place] [address] waiver authority and the [employer] and [employer] Act, this Virtual Visit was conducted with patient's (and/or legal guardian's) consent, to reduce the patient's risk of exposure to COVID-19 and provide necessary medical care. The patient (and/or legal guardian) has also been advised to contact this office for worsening conditions or problems, and seek emergency medical treatment and/or call 911 if deemed necessary.

## 2021-10-15 ENCOUNTER — TELEPHONE (OUTPATIENT)
Dept: BARIATRICS/WEIGHT MGMT | Age: 41
End: 2021-10-15

## 2021-12-17 ENCOUNTER — PROCEDURE VISIT (OUTPATIENT)
Dept: SURGERY | Age: 41
End: 2021-12-17
Payer: COMMERCIAL

## 2021-12-17 ENCOUNTER — OFFICE VISIT (OUTPATIENT)
Dept: SURGERY | Age: 41
End: 2021-12-17
Payer: COMMERCIAL

## 2021-12-17 VITALS — SYSTOLIC BLOOD PRESSURE: 154 MMHG | DIASTOLIC BLOOD PRESSURE: 70 MMHG | BODY MASS INDEX: 51.37 KG/M2 | WEIGHT: 290 LBS

## 2021-12-17 DIAGNOSIS — I70.213 INTERMITTENT CLAUDICATION OF BOTH LOWER EXTREMITIES DUE TO ATHEROSCLEROSIS (HCC): ICD-10-CM

## 2021-12-17 DIAGNOSIS — Z72.0 TOBACCO USE: ICD-10-CM

## 2021-12-17 DIAGNOSIS — I63.232 CEREBROVASCULAR ACCIDENT (CVA) DUE TO OCCLUSION OF LEFT CAROTID ARTERY (HCC): ICD-10-CM

## 2021-12-17 DIAGNOSIS — E66.01 MORBID OBESITY WITH BMI OF 50.0-59.9, ADULT (HCC): ICD-10-CM

## 2021-12-17 DIAGNOSIS — I65.22 OCCLUSION OF LEFT CAROTID ARTERY: Primary | ICD-10-CM

## 2021-12-17 PROCEDURE — G8427 DOCREV CUR MEDS BY ELIG CLIN: HCPCS | Performed by: SURGERY

## 2021-12-17 PROCEDURE — 99214 OFFICE O/P EST MOD 30 MIN: CPT | Performed by: SURGERY

## 2021-12-17 PROCEDURE — G8417 CALC BMI ABV UP PARAM F/U: HCPCS | Performed by: SURGERY

## 2021-12-17 PROCEDURE — G8484 FLU IMMUNIZE NO ADMIN: HCPCS | Performed by: SURGERY

## 2021-12-17 PROCEDURE — 93880 EXTRACRANIAL BILAT STUDY: CPT | Performed by: STUDENT IN AN ORGANIZED HEALTH CARE EDUCATION/TRAINING PROGRAM

## 2021-12-17 PROCEDURE — 4004F PT TOBACCO SCREEN RCVD TLK: CPT | Performed by: SURGERY

## 2021-12-17 RX ORDER — LOSARTAN POTASSIUM 100 MG/1
TABLET ORAL
COMMUNITY
Start: 2021-12-13

## 2021-12-17 RX ORDER — AMLODIPINE BESYLATE AND ATORVASTATIN CALCIUM 5; 10 MG/1; MG/1
1 TABLET, FILM COATED ORAL DAILY
COMMUNITY

## 2021-12-17 ASSESSMENT — ENCOUNTER SYMPTOMS
BACK PAIN: 1
RESPIRATORY NEGATIVE: 1
GASTROINTESTINAL NEGATIVE: 1
COLOR CHANGE: 0
ALLERGIC/IMMUNOLOGIC NEGATIVE: 1

## 2021-12-17 NOTE — PROGRESS NOTES
Daily Progress Note   Millie Guzman MD      12/17/2021    Chief Complaint   Patient presents with    6 Month Follow-Up     6 month CDS and office visit. Pt has no complaints. HISTORY OF PRESENT ILLNESS:                The patient is a 39 y.o. female who presents with a six month follow up for carotid stenosis and a carotid duplex scan. Mouna has a history of stroke as well as hyperlipidemia. She has a left carotid that is blocked. She says she is down to about half a pack of cigarettes a day. She can't walk too far without stopping because of back pain. She does have some claudication in both legs. Mouna says she is seeing a stroke specialist at [facility]. She says she has  cerebral angiograms planned after the first of the year.   By her neurologist    Past Medical History:   Diagnosis Date    Anxiety     Carotid stenosis 12/2/2015    [facility]erebral artery occlusion [facility] infarction [address] [address]     Depression     Graves disease     Hyperlipidemia     Hyperlipidemia     Hypertension     Hypertension, essential     Intermittent claudication of both lower extremities due to atherosclerosis ([facility] [address] 7/10/2020    Morbid obesity with BMI of 50.0-59.9, adult ([facility] [address] [facility]     Stroke [facility])        Past Surgical History:   Procedure Laterality Date    DENTAL SURGERY      TONSILLECTOMY         Social History     Socioeconomic History    Marital status:      Spouse name: Not on file    Number of children: Not on file    Years of education: Not on file    Highest education level: Not on file   Occupational History    Not on file   Tobacco Use    Smoking status: Current Every Day Smoker     Packs/day: 1.00     Years: 15.00     Pack years: 15.00     Types: Cigarettes    Smokeless tobacco: Never Used    Tobacco comment: Quit smoking 7 months ago, but started up again   Substance and Sexual Activity    Alcohol use: No    Drug use: No    Sexual activity: Not on file   Other Topics Concern    Not on file Social History Narrative    Not on file     Social Determinants of Health     Financial Resource Strain:     Difficulty of Paying Living Expenses: Not on file   Food Insecurity:     Worried About Running Out of Food in the Last Year: Not on file    Cecile of Food in the Last Year: Not on file   Transportation Needs:     Lack of Transportation (Medical): Not on file    Lack of Transportation (Non-Medical):  Not on file   Physical Activity:     Days of Exercise per Week: Not on file    Minutes of Exercise per Session: Not on file   Stress:     Feeling of Stress : Not on file   Social Connections:     Frequency of Communication with Friends and Family: Not on file    Frequency of Social Gatherings with Friends and Family: Not on file    Attends Zoroastrianism Services: Not on file    Active Member of [address] [place] [address] [employer] or Organizations: Not on file    Attends Club or Organization Meetings: Not on file    Marital Status: Not on file   Intimate Partner Violence:     Fear of Current or Ex-Partner: Not on file    Emotionally Abused: Not on file    Physically Abused: Not on file    Sexually Abused: Not on file   Housing Stability:     Unable to Pay for Housing in the Last Year: Not on file    Number of [place] in the Last Year: Not on file    Unstable Housing in the Last Year: Not on file       Family History   Problem Relation Age of Onset    Arthritis Mother     High Blood Pressure Mother     High Cholesterol Mother     Hypertension Mother     COPD Mother     Cancer Maternal Uncle     Diabetes Paternal Aunt     Diabetes Paternal Uncle     Cancer Maternal Grandmother     Cancer Maternal Grandfather     Hypertension Father     Hypertension Brother          Current Outpatient Medications:     amLODIPine-atorvastatatin (CADUET) 5-10 MG per tablet, Take 1 tablet by mouth daily, Disp: , Rfl:     losartan (COZAAR) 100 MG tablet, , Disp: , Rfl:     naltrexone-buPROPion (CONTRAVE) 8-90 MG per extended release tablet, Start:1 tab po qam, then 1 tab po bid x 1 wk, then 2 tabs po qam and 1 tab po qpm x 1 wk; Max 4 tabs/day, Disp: 120 tablet, Rfl: 0    vitamin D (ERGOCALCIFEROL) 1.25 MG (83135 UT) CAPS capsule, Take 1 capsule by mouth once a week, Disp: 8 capsule, Rfl: 0    folic acid (FOLVITE) 1 MG tablet, Take 1 tablet by mouth daily, Disp: 30 tablet, Rfl: 3    aspirin (PAMELA ASPIRIN) 325 MG tablet, Take 1 tablet by mouth daily, Disp: 30 tablet, Rfl: 3    atorvastatin (LIPITOR) 80 MG tablet, Take 80 mg by mouth daily, Disp: , Rfl:     busPIRone (BUSPAR) 15 MG tablet, Take 15 mg by mouth nightly , Disp: , Rfl:     losartan (COZAAR) 50 MG tablet, Take 50 mg by mouth daily  (Patient not taking: Reported on 12/17/2021), Disp: , Rfl:     Morphine    Vitals:    12/17/21 1107 12/17/21 1108   BP: (!) 158/70 (!) 154/70   Weight: 290 lb (131.5 kg)        Orders Only on 09/25/2021   Component Date Value Ref Range Status    Sodium 09/25/2021 139  136 - 145 mmol/L Final    Potassium 09/25/2021 4.8  3.5 - 5.1 mmol/L Final    Chloride 09/25/2021 100  99 - 110 mmol/L Final    CO2 09/25/2021 24  21 - 32 mmol/L Final    Anion Gap 09/25/2021 15  3 - 16 Final    Glucose 09/25/2021 109* 70 - 99 mg/dL Final    BUN 09/25/2021 14  7 - 20 mg/dL Final    CREATININE 09/25/2021 0.9  0.6 - 1.1 mg/dL Final    GFR Non- 09/25/2021 >60  >60 Final    Comment: >60 mL/min/1.73m2 EGFR, calc. for ages 25 and older using the  MDRD formula (not corrected for weight), is valid for stable  renal function.  GFR  09/25/2021 >60  >60 Final    Comment: Chronic Kidney Disease: less than 60 ml/min/1.73 sq.m. Kidney Failure: less than 15 ml/min/1.73 sq.m. Results valid for patients 18 years and older.       Calcium 09/25/2021 9.3  8.3 - 10.6 mg/dL Final    Total Protein 09/25/2021 7.6  6.4 - 8.2 g/dL Final    Albumin 09/25/2021 4.2  3.4 - 5.0 g/dL Final    Albumin/Globulin Ratio 09/25/2021 1.2  1.1 - 2.2 Final    Total Bilirubin 09/25/2021 0.3  0.0 - 1.0 mg/dL Final    Alkaline Phosphatase 09/25/2021 128  40 - 129 U/L Final    ALT 09/25/2021 15  10 - 40 U/L Final    AST 09/25/2021 12* 15 - 37 U/L Final    Globulin 09/25/2021 3.4  g/dL Final       Review of Systems   Constitutional: Negative for chills and fever. HENT: Negative. Eyes: Negative for visual disturbance (wears glasses). Respiratory: Negative. Cardiovascular: Positive for leg swelling. Leg pain with walking   Gastrointestinal: Negative. Genitourinary: Negative. Musculoskeletal: Positive for back pain. Skin: Negative for color change and wound. Allergic/Immunologic: Negative. Neurological: Negative for speech difficulty, weakness and numbness. Hematological: Negative. Psychiatric/Behavioral: Negative. All other systems reviewed and are negative. Physical Exam  Vitals and nursing note reviewed. Constitutional:       Appearance: Normal appearance. She is well-developed. Comments: Morbid obesity   HENT:      Head: Normocephalic. Right Ear: Hearing and external ear normal.      Left Ear: Hearing and external ear normal.   Neck:      Vascular: No carotid bruit. Cardiovascular:      Rate and Rhythm: Normal rate and regular rhythm. Pulses:           Carotid pulses are 2+ on the right side and 2+ on the left side. Radial pulses are 2+ on the right side and 2+ on the left side. Femoral pulses are 1+ on the right side and 2+ on the left side. Popliteal pulses are 2+ on the right side and 2+ on the left side. Dorsalis pedis pulses are 2+ on the right side and 1+ on the left side. Posterior tibial pulses are 1+ on the right side and 0 on the left side. Heart sounds: Normal heart sounds.       Comments:   Doppler 12/17/21:  Rt DP: monophasic  Rt PT: biphasic   Rt AT:     Lt DP: biphasic  Lt PT: monophasic (weak)  Lt AT:    Doppler 6/11/2021:  Rt DP: monophasic (weak)  Rt PT: biphasic  Rt AT: not checked    Lt DP: monophasic (strong)  Lt PT: biphasic  Lt AT: not checked      [facility] 3/22/2021  Right:  P.T.: 110 D. P.: 126 ARM BP:            P.I.: 0.74/0.85  Left:  P.T.: 104 D.P.: 112 ARM BP: 148    P. I.: 0.70/0.76    [facility] 7/9/2020  Right:  P.T.: 132 D.P.: 143 ARM BP: 176/71 P. I.: 0.75/0.81  Left:  P.T.: 130 D. P.: 108 ARM BP: 170/69 P. I.: 0.73/0.61  Pulmonary:      Effort: No respiratory distress. Breath sounds: No decreased breath sounds, wheezing or rales. Chest:      Chest wall: No tenderness. Musculoskeletal:         General: Normal range of motion. Cervical back: Normal range of motion and neck supple. Lumbar back: Normal. No bony tenderness. Right knee: No swelling or deformity. No tenderness. Left knee: No swelling or deformity. No tenderness. Right lower leg: Edema (nonpitting edema; right ankle measures 23.7 cm, calf 46.4 cm) present. Left lower leg: Edema (nonpitting edema; left ankle measures 23.8 cm, calf 46.2 cm) present. Skin:     General: Skin is warm and dry. Findings: No bruising or ecchymosis. Neurological:      Mental Status: She is alert and oriented to person, place, and time. Cranial Nerves: No cranial nerve deficit. Sensory: No sensory deficit. Coordination: Coordination normal.      Gait: Gait normal.   Psychiatric:         Speech: Speech normal.         Behavior: Behavior normal.         Thought Content: Thought content normal.         Judgment: Judgment normal.       She had gestational diabetes. She has history of stroke.      ASSESSMENT:    Problem List Items Addressed This Visit        Unprioritized    Tobacco use    Occlusion of left carotid artery - Primary    Morbid obesity with BMI of 50.0-59.9, adult (HCC)    Intermittent claudication of both lower extremities due to atherosclerosis [facility])    Cerebrovascular accident (CVA) due to vascular occlusion [facility])        She has a history of

## 2022-04-21 NOTE — TELEPHONE ENCOUNTER
Patient requested prescription for Contrave to be switch from [facility] to [facility] (mail order) - completed    Patient advised to contact the office once prescription is received via mail  to schedule 2 wk f/u with Dr Christo Magdaelno. Detail Level: Zone Detail Level: Simple Detail Level: Detailed

## 2022-07-29 ENCOUNTER — OFFICE VISIT (OUTPATIENT)
Dept: VASCULAR SURGERY | Age: 42
End: 2022-07-29
Payer: COMMERCIAL

## 2022-07-29 ENCOUNTER — PROCEDURE VISIT (OUTPATIENT)
Dept: SURGERY | Age: 42
End: 2022-07-29
Payer: COMMERCIAL

## 2022-07-29 VITALS — DIASTOLIC BLOOD PRESSURE: 70 MMHG | BODY MASS INDEX: 51.55 KG/M2 | SYSTOLIC BLOOD PRESSURE: 148 MMHG | WEIGHT: 291 LBS

## 2022-07-29 DIAGNOSIS — I65.22 OCCLUSION OF LEFT CAROTID ARTERY: Primary | ICD-10-CM

## 2022-07-29 DIAGNOSIS — I63.232 CEREBROVASCULAR ACCIDENT (CVA) DUE TO OCCLUSION OF LEFT CAROTID ARTERY (HCC): ICD-10-CM

## 2022-07-29 DIAGNOSIS — I65.22 OCCLUSION OF LEFT CAROTID ARTERY: ICD-10-CM

## 2022-07-29 DIAGNOSIS — I70.213 INTERMITTENT CLAUDICATION OF BOTH LOWER EXTREMITIES DUE TO ATHEROSCLEROSIS (HCC): ICD-10-CM

## 2022-07-29 DIAGNOSIS — I65.21 STENOSIS OF RIGHT CAROTID ARTERY: Primary | ICD-10-CM

## 2022-07-29 PROCEDURE — 93880 EXTRACRANIAL BILAT STUDY: CPT | Performed by: SURGERY

## 2022-07-29 PROCEDURE — 99214 OFFICE O/P EST MOD 30 MIN: CPT | Performed by: SURGERY

## 2022-07-29 RX ORDER — AMLODIPINE BESYLATE 5 MG/1
TABLET ORAL
COMMUNITY
Start: 2022-07-14

## 2022-07-29 RX ORDER — LANOLIN ALCOHOL/MO/W.PET/CERES
CREAM (GRAM) TOPICAL
COMMUNITY
Start: 2022-07-03

## 2022-07-29 RX ORDER — BUPROPION HYDROCHLORIDE 150 MG/1
TABLET ORAL
COMMUNITY
Start: 2022-07-14

## 2022-07-29 ASSESSMENT — ENCOUNTER SYMPTOMS
COLOR CHANGE: 0
RESPIRATORY NEGATIVE: 1
ALLERGIC/IMMUNOLOGIC NEGATIVE: 1
BACK PAIN: 1
GASTROINTESTINAL NEGATIVE: 1

## 2022-07-29 NOTE — PROGRESS NOTES
Daily Progress Note   Bertha Pedraza MD      7/29/2022    Chief Complaint   Patient presents with    6 Month Follow-Up     6 month bilateral CDS and office visit. Pt states legs are sore. HISTORY OF PRESENT ILLNESS:                The patient is a 39 y.o. female who presents with a six month follow up for carotid stenosis and a carotid duplex scan. Mouna has a history of stroke as well as hyperlipidemia. She has a left carotid that is blocked. She says she is down to about half a pack of cigarettes a day. She can't walk too far without stopping because of back pain. She does have some claudication in both legs. Mouna says she is seeing a stroke specialist at [facility].   She says she has cerebral perfusion studies done and they do not think she needs an EC IC bypass by her neurologist.  Her neurologist is still recommending seeing the surgeon in case she gets further future ischemia    Past Medical History:   Diagnosis Date    Anxiety     Carotid stenosis 12/2/2015    Cerebral artery occlusion with cerebral infarction ([facility] [address]     Depression     Graves disease     Hyperlipidemia     Hyperlipidemia     Hypertension     Hypertension, essential     Intermittent claudication of both lower extremities due to atherosclerosis ([address] 7/10/2020    Morbid obesity with BMI of 50.0-59.9, adult ([address]     Stroke [facility])        Past Surgical History:   Procedure Laterality Date    DENTAL SURGERY      TONSILLECTOMY         Social History     Socioeconomic History    Marital status:      Spouse name: Not on file    Number of children: Not on file    Years of education: Not on file    Highest education level: Not on file   Occupational History    Not on file   Tobacco Use    Smoking status: Every Day     Packs/day: 1.00     Years: 15.00     Pack years: 15.00     Types: Cigarettes    Smokeless tobacco: Never    Tobacco comments:     Quit smoking 7 months ago, but started up again   Substance and Sexual Activity    Alcohol use: No    Drug use: No    Sexual activity: Not on file   Other Topics Concern    Not on file   Social History Narrative    Not on file     Social Determinants of Health     Financial Resource Strain: Not on file   Food Insecurity: Not on file   Transportation Needs: Not on file   Physical Activity: Not on file   Stress: Not on file   Social Connections: Not on file   Intimate Partner Violence: Not on file   Housing Stability: Not on file       Family History   Problem Relation Age of Onset    Arthritis Mother     High Blood Pressure Mother     High Cholesterol Mother     Hypertension Mother     COPD Mother     Cancer Maternal Uncle     Diabetes Paternal Aunt     Diabetes Paternal Uncle     Cancer Maternal Grandmother     Cancer Maternal Grandfather     Hypertension Father     Hypertension Brother          Current Outpatient Medications:     buPROPion (WELLBUTRIN XL) 150 MG extended release tablet, , Disp: , Rfl:     amLODIPine (NORVASC) 5 MG tablet, , Disp: , Rfl:     vitamin B-12 (CYANOCOBALAMIN) 1000 MCG tablet, , Disp: , Rfl:     metFORMIN (GLUCOPHAGE) 500 MG tablet, , Disp: , Rfl:     amLODIPine-atorvastatatin (CADUET) 5-10 MG per tablet, Take 1 tablet by mouth daily, Disp: , Rfl:     losartan (COZAAR) 100 MG tablet, , Disp: , Rfl:     naltrexone-buPROPion (CONTRAVE) 8-90 MG per extended release tablet, Start:1 tab po qam, then 1 tab po bid x 1 wk, then 2 tabs po qam and 1 tab po qpm x 1 wk;  Max 4 tabs/day, Disp: 120 tablet, Rfl: 0    vitamin D (ERGOCALCIFEROL) 1.25 MG (30734 UT) CAPS capsule, Take 1 capsule by mouth once a week, Disp: 8 capsule, Rfl: 0    folic acid (FOLVITE) 1 MG tablet, Take 1 tablet by mouth daily, Disp: 30 tablet, Rfl: 3    aspirin (PAMELA ASPIRIN) 325 MG tablet, Take 1 tablet by mouth daily, Disp: 30 tablet, Rfl: 3    losartan (COZAAR) 50 MG tablet, Take 50 mg by mouth in the morning., Disp: , Rfl:     atorvastatin (LIPITOR) 80 MG tablet, Take 80 mg by mouth daily, Disp: , Rfl:     busPIRone (BUSPAR) 15 MG tablet, Take 15 mg by mouth nightly , Disp: , Rfl:     Morphine    Vitals:    07/29/22 1142 07/29/22 1143   BP: (!) 140/68 (!) 148/70   Weight: 291 lb (132 kg)        Orders Only on 05/06/2022   Component Date Value Ref Range Status    Hemoglobin A1C 05/06/2022 6.2  See comment % Final    Comment: Comment:  Diagnosis of Diabetes: > or = 6.5%  Increased risk of diabetes (Prediabetes): 5.7-6.4%  Glycemic Control: Nonpregnant Adults: <7.0%                    Pregnant: <6.0%        eAG 05/06/2022 131.2  mg/dL Final       Review of Systems   Constitutional:  Negative for chills and fever. HENT: Negative. Eyes:  Negative for visual disturbance (wears glasses). Respiratory: Negative. Cardiovascular:  Positive for leg swelling. Leg pain with walking   Gastrointestinal: Negative. Genitourinary: Negative. Musculoskeletal:  Positive for back pain. Skin:  Negative for color change and wound. Allergic/Immunologic: Negative. Neurological:  Negative for speech difficulty, weakness and numbness. Hematological: Negative. Psychiatric/Behavioral: Negative. All other systems reviewed and are negative. Physical Exam  Vitals and nursing note reviewed. Constitutional:       Appearance: Normal appearance. She is well-developed. Comments: Morbid obesity   HENT:      Head: Normocephalic. Right Ear: Hearing and external ear normal.      Left Ear: Hearing and external ear normal.   Neck:      Vascular: No carotid bruit. Cardiovascular:      Rate and Rhythm: Normal rate and regular rhythm. Pulses:           Carotid pulses are 2+ on the right side and 2+ on the left side. Radial pulses are 2+ on the right side and 2+ on the left side. Femoral pulses are 2+ on the right side and 1+ on the left side. Dorsalis pedis pulses are 1+ on the right side and 2+ on the left side. Posterior tibial pulses are 0 on the right side and 0 on the left side. Heart sounds: Normal heart sounds. Comments: Doppler 7/29/22:  Rt DP: Monophasic  Rt PT: Monophasic  Rt Fem: Biphasic    Lt DP: Biphasic  Lt PT: Biphasic  Lt Fem: Monophasic    Doppler 12/17/21:  Rt DP: monophasic  Rt PT: biphasic   Rt AT:     Lt DP: biphasic  Lt PT: monophasic (weak)  Lt AT:    Doppler 6/11/2021:  Rt DP: monophasic (weak)  Rt PT: biphasic  Rt AT: not checked    Lt DP: monophasic (strong)  Lt PT: biphasic  Lt AT: not checked      [facility] 3/22/2021  Right:  P.T.: 110 D. P.: 126 ARM BP:            P.I.: 0.74/0.85  Left:  P.T.: 104 D.P.: 112 ARM BP: 148    P. I.: 0.70/0.76    [facility] 7/9/2020  Right:  P.T.: 132 D.P.: 143 ARM BP: 176/71 P. I.: 0.75/0.81  Left:  P.T.: 130 D. P.: 108 ARM BP: 170/69 P. I.: 0.73/0.61  Pulmonary:      Effort: No respiratory distress. Breath sounds: No decreased breath sounds, wheezing or rales. Chest:      Chest wall: No tenderness. Musculoskeletal:         General: Normal range of motion. Cervical back: Normal range of motion and neck supple. Lumbar back: Normal. No bony tenderness. Right knee: No swelling or deformity. No tenderness. Left knee: No swelling or deformity. No tenderness. Right lower leg: Edema (nonpitting edema; right ankle measures 23.7 cm, calf 46.4 cm) present. Left lower leg: Edema (nonpitting edema; left ankle measures 23.8 cm, calf 46.2 cm) present. Skin:     General: Skin is warm and dry. Findings: No bruising or ecchymosis. Neurological:      Mental Status: She is alert and oriented to person, place, and time. Cranial Nerves: No cranial nerve deficit. Sensory: No sensory deficit. Coordination: Coordination normal.      Gait: Gait normal.   Psychiatric:         Speech: Speech normal.         Behavior: Behavior normal.         Thought Content: Thought content normal.         Judgment: Judgment normal.     She had gestational diabetes. She has history of stroke.      ASSESSMENT:    Problem List Items Addressed This Visit       Carotid stenosis - Primary    Occlusion of left carotid artery    Intermittent claudication of both lower extremities due to atherosclerosis (HCC)    Relevant Medications    buPROPion (WELLBUTRIN XL) 150 MG extended release tablet   She has right-sided arterial claudication continues to smoke, I stressed the importance of stopping since she has had a stroke and leg symptoms at such a young age  PLAN:  [place] is suspected by the neurologist this is a rare progressive cerebrovascular disorder, is caused by blocked arteries at the base of the brain in the basal ganglia  Schedule to return in six months for bilateral carotid duplex scan and office visit. I Bill Rdz MA am scribing for and in the presence of Ben Cast MD on this date of 07/29/22 at 12:35 PM     I Fernando Goodman MD personally performed the services described in this documentation as scribed by the Medical Assistant Bill Rdz in my presence and it is both accurate and complete.       Electronically signed by Ben Cast MD on 7/29/2022 at 12:35 PM

## 2023-02-03 ENCOUNTER — PROCEDURE VISIT (OUTPATIENT)
Dept: SURGERY | Age: 43
End: 2023-02-03

## 2023-02-03 ENCOUNTER — OFFICE VISIT (OUTPATIENT)
Dept: VASCULAR SURGERY | Age: 43
End: 2023-02-03
Payer: COMMERCIAL

## 2023-02-03 VITALS — BODY MASS INDEX: 51.55 KG/M2 | WEIGHT: 291 LBS | DIASTOLIC BLOOD PRESSURE: 70 MMHG | SYSTOLIC BLOOD PRESSURE: 140 MMHG

## 2023-02-03 DIAGNOSIS — E66.01 MORBID OBESITY DUE TO EXCESS CALORIES (HCC): ICD-10-CM

## 2023-02-03 DIAGNOSIS — I65.22 OCCLUSION OF LEFT CAROTID ARTERY: ICD-10-CM

## 2023-02-03 DIAGNOSIS — E78.2 MIXED HYPERLIPIDEMIA: ICD-10-CM

## 2023-02-03 DIAGNOSIS — I65.22 OCCLUSION OF LEFT CAROTID ARTERY: Primary | ICD-10-CM

## 2023-02-03 DIAGNOSIS — I65.01 VERTEBRAL ARTERY OCCLUSION, RIGHT: ICD-10-CM

## 2023-02-03 DIAGNOSIS — I10 HYPERTENSION, ESSENTIAL: ICD-10-CM

## 2023-02-03 DIAGNOSIS — Z72.0 TOBACCO USE: ICD-10-CM

## 2023-02-03 DIAGNOSIS — I70.213 INTERMITTENT CLAUDICATION OF BOTH LOWER EXTREMITIES DUE TO ATHEROSCLEROSIS (HCC): ICD-10-CM

## 2023-02-03 DIAGNOSIS — I63.232 CEREBROVASCULAR ACCIDENT (CVA) DUE TO OCCLUSION OF LEFT CAROTID ARTERY (HCC): Primary | ICD-10-CM

## 2023-02-03 PROCEDURE — 3077F SYST BP >= 140 MM HG: CPT | Performed by: SURGERY

## 2023-02-03 PROCEDURE — G8484 FLU IMMUNIZE NO ADMIN: HCPCS | Performed by: SURGERY

## 2023-02-03 PROCEDURE — G8417 CALC BMI ABV UP PARAM F/U: HCPCS | Performed by: SURGERY

## 2023-02-03 PROCEDURE — 99214 OFFICE O/P EST MOD 30 MIN: CPT | Performed by: SURGERY

## 2023-02-03 PROCEDURE — 3078F DIAST BP <80 MM HG: CPT | Performed by: SURGERY

## 2023-02-03 PROCEDURE — G8427 DOCREV CUR MEDS BY ELIG CLIN: HCPCS | Performed by: SURGERY

## 2023-02-03 PROCEDURE — 4004F PT TOBACCO SCREEN RCVD TLK: CPT | Performed by: SURGERY

## 2023-02-03 ASSESSMENT — ENCOUNTER SYMPTOMS
ALLERGIC/IMMUNOLOGIC NEGATIVE: 1
RESPIRATORY NEGATIVE: 1
BACK PAIN: 1
GASTROINTESTINAL NEGATIVE: 1

## 2023-02-03 NOTE — PROGRESS NOTES
Daily Progress Note   Ja Bui MD      2/3/2023    Chief Complaint   Patient presents with    6 Month Follow-Up     6 month carotid duplex scan and office visit.          HISTORY OF PRESENT ILLNESS:                The patient is a 42 y.o. female who presents with a six month follow up for a carotid duplex scan and office visit.    Mouna denies any stroke or TIA symptoms, but says she is having chest pain. She is still smoking a pack of cigarettes a day. She does say she has more pain on the right leg, and sometimes has to stop when going up stairs both because of pain and because she becomes short of breath.     Her scan today shows an occlusion of the left carotid artery which is unchanged from her previous scan. Her right vertebral artery is not able to be visualized.     Past Medical History:   Diagnosis Date    Anxiety     Carotid stenosis 12/2/2015    Cerebral artery occlusion with cerebral infarction (HCC)     Depression     Graves disease     Hyperlipidemia     Hyperlipidemia     Hypertension     Hypertension, essential     Intermittent claudication of both lower extremities due to atherosclerosis (HCC) 7/10/2020    Morbid obesity with BMI of 50.0-59.9, adult (HCC)     Stroke ([facility])        Past Surgical History:   Procedure Laterality Date    DENTAL SURGERY      TONSILLECTOMY         Social History     Socioeconomic History    Marital status:      Spouse name: Not on file    Number of children: Not on file    Years of education: Not on file    Highest education level: Not on file   Occupational History    Not on file   Tobacco Use    Smoking status: Every Day     Packs/day: 1.00     Years: 15.00     Pack years: 15.00     Types: Cigarettes    Smokeless tobacco: Never    Tobacco comments:     Quit smoking 7 months ago, but started up again   Substance and Sexual Activity    Alcohol use: No    Drug use: No    Sexual activity: Not on file   Other Topics Concern    Not on file   Social History Narrative  Not on file     Social Determinants of Health     Financial Resource Strain: Not on file   Food Insecurity: Not on file   Transportation Needs: Not on file   Physical Activity: Not on file   Stress: Not on file   Social Connections: Not on file   Intimate Partner Violence: Not on file   Housing Stability: Not on file       Family History   Problem Relation Age of Onset    Arthritis Mother     High Blood Pressure Mother     High Cholesterol Mother     Hypertension Mother     COPD Mother     Cancer Maternal Uncle     Diabetes Paternal Aunt     Diabetes Paternal Uncle     Cancer Maternal Grandmother     Cancer Maternal Grandfather     Hypertension Father     Hypertension Brother          Current Outpatient Medications:     buPROPion (WELLBUTRIN XL) 150 MG extended release tablet, , Disp: , Rfl:     amLODIPine (NORVASC) 5 MG tablet, , Disp: , Rfl:     vitamin B-12 (CYANOCOBALAMIN) 1000 MCG tablet, , Disp: , Rfl:     metFORMIN (GLUCOPHAGE) 500 MG tablet, , Disp: , Rfl:     amLODIPine-atorvastatatin (CADUET) 5-10 MG per tablet, Take 1 tablet by mouth daily, Disp: , Rfl:     losartan (COZAAR) 100 MG tablet, , Disp: , Rfl:     naltrexone-buPROPion (CONTRAVE) 8-90 MG per extended release tablet, Start:1 tab po qam, then 1 tab po bid x 1 wk, then 2 tabs po qam and 1 tab po qpm x 1 wk;  Max 4 tabs/day, Disp: 120 tablet, Rfl: 0    vitamin D (ERGOCALCIFEROL) 1.25 MG (65811 UT) CAPS capsule, Take 1 capsule by mouth once a week, Disp: 8 capsule, Rfl: 0    folic acid (FOLVITE) 1 MG tablet, Take 1 tablet by mouth daily, Disp: 30 tablet, Rfl: 3    aspirin (PAMELA ASPIRIN) 325 MG tablet, Take 1 tablet by mouth daily, Disp: 30 tablet, Rfl: 3    losartan (COZAAR) 50 MG tablet, Take 50 mg by mouth in the morning., Disp: , Rfl:     atorvastatin (LIPITOR) 80 MG tablet, Take 80 mg by mouth daily, Disp: , Rfl:     busPIRone (BUSPAR) 15 MG tablet, Take 15 mg by mouth nightly , Disp: , Rfl:     Morphine    Vitals:    02/03/23 1054 02/03/23 1055   BP: (!) 140/70 (!) 140/70   Weight: 291 lb (132 kg)        Orders Only on 05/06/2022   Component Date Value Ref Range Status    Hemoglobin A1C 05/06/2022 6.2  See comment % Final    Comment: Comment:  Diagnosis of Diabetes: > or = 6.5%  Increased risk of diabetes (Prediabetes): 5.7-6.4%  Glycemic Control: Nonpregnant Adults: <7.0%                    Pregnant: <6.0%        eAG 05/06/2022 131.2  mg/dL Final       Review of Systems   HENT: Negative. Eyes:  Positive for visual disturbance (wears glasses). Respiratory: Negative. Cardiovascular:  Positive for leg swelling. Leg pain with walking   Gastrointestinal: Negative. Genitourinary: Negative. Musculoskeletal:  Positive for back pain. Allergic/Immunologic: Negative. Hematological: Negative. Psychiatric/Behavioral: Negative. All other systems reviewed and are negative. Physical Exam  Vitals and nursing note reviewed. Constitutional:       Appearance: Normal appearance. She is well-developed. Comments: Morbid obesity   HENT:      Head: Normocephalic. Right Ear: Hearing and external ear normal.      Left Ear: Hearing and external ear normal.   Neck:      Vascular: No carotid bruit. Cardiovascular:      Rate and Rhythm: Normal rate and regular rhythm. Pulses:           Carotid pulses are 2+ on the right side and 2+ on the left side. Radial pulses are 2+ on the right side and 2+ on the left side. Femoral pulses are 1+ on the right side and 1+ on the left side. Dorsalis pedis pulses are 1+ on the right side and 2+ on the left side. Posterior tibial pulses are 0 on the right side and 0 on the left side. Heart sounds: Normal heart sounds. Comments:   Doppler 2/3/2023:  Rt DP: monophasic  Rt PT: monophasic (strong, nearly biphasic)  Rt AT:   Rt Femoral: monophasic    Lt DP: biphasic  Lt PT: biphasic   Lt AT:  Lt Femoral: monophasic  Doppler 7/29/22:  Rt DP:  Monophasic  Rt PT: Monophasic  Rt Fem: Biphasic    Lt DP: Biphasic  Lt PT: Biphasic  Lt Fem: Monophasic    Doppler 12/17/21:  Rt DP: monophasic  Rt PT: biphasic   Rt AT:     Lt DP: biphasic  Lt PT: monophasic (weak)  Lt AT:    Doppler 6/11/2021:  Rt DP: monophasic (weak)  Rt PT: biphasic  Rt AT: not checked    Lt DP: monophasic (strong)  Lt PT: biphasic  Lt AT: not checked      [facility] 3/22/2021  Right:  P.T.: 110 D. P.: 126 ARM BP:            P.I.: 0.74/0.85  Left:  P.T.: 104 D.P.: 112 ARM BP: 148    P. I.: 0.70/0.76    [facility] 7/9/2020  Right:  P.T.: 132 D.P.: 143 ARM BP: 176/71 P. I.: 0.75/0.81  Left:  P.T.: 130 D. P.: 108 ARM BP: 170/69 P. I.: 0.73/0.61  Pulmonary:      Effort: No respiratory distress. Breath sounds: No decreased breath sounds, wheezing or rales. Chest:      Chest wall: No tenderness. Musculoskeletal:         General: Normal range of motion. Cervical back: Normal range of motion and neck supple. Lumbar back: Normal. No bony tenderness. Right knee: No swelling or deformity. No tenderness. Left knee: No swelling or deformity. No tenderness. Right lower leg: Edema (nonpitting edema; right ankle measures 23.7 cm, calf 46.4 cm) present. Left lower leg: Edema (nonpitting edema; left ankle measures 23.8 cm, calf 46.2 cm) present. Skin:     General: Skin is warm and dry. Findings: No bruising or ecchymosis. Neurological:      Mental Status: She is alert and oriented to person, place, and time. Cranial Nerves: No cranial nerve deficit. Sensory: No sensory deficit. Coordination: Coordination normal.      Gait: Gait normal.   Psychiatric:         Speech: Speech normal.         Behavior: Behavior normal.         Thought Content:  Thought content normal.         Judgment: Judgment normal.         ASSESSMENT:    Problem List Items Addressed This Visit          Medium    Vertebral artery occlusion, right       Unprioritized    Hyperlipidemia    Occlusion of left carotid artery Tobacco use    Morbid obesity due to excess calories (HCC)    Intermittent claudication of both lower extremities due to atherosclerosis [facility])    Cerebrovascular accident (CVA) due to vascular occlusion ([facility] [address] - Primary    Hypertension, essential     So today the new finding is right vertebral artery occlusion could not see the right vertebral artery and 6 months ago it was open the left artery carotid is occluded chronically. Explained that she only has 2 vessel out of 4 on the left supplying her brain. I insisted that she quit smoking as at she is already got the arteries of an 80-year-old at 43years old, explained this in front of the patient and the . Otherwise she needs to keep her other risk factors down her hypertension and consider weight loss. This may also help her high cholesterol  As far as her claudication symptoms are intermittent and may well be more related to her back, I explained to her that arterial symptoms are usually consistent the same distance the same symptoms every time  PLAN:    Schedule to return in six months for a bilateral carotid duplex scan and office visit. Cole Piedra MA am scribing for and in the presence of Mo Champion MD on this date of 02/03/23    I Cali Alegria MD personally performed the services described in this documentation as scribed by the Medical Assistant Sloane Carver in my presence and it is both accurate and complete.       Electronically signed by Mo Champion MD on 2/3/2023 at 3:07 PM

## 2023-08-04 ENCOUNTER — OFFICE VISIT (OUTPATIENT)
Dept: VASCULAR SURGERY | Age: 43
End: 2023-08-04
Payer: COMMERCIAL

## 2023-08-04 ENCOUNTER — PROCEDURE VISIT (OUTPATIENT)
Dept: SURGERY | Age: 43
End: 2023-08-04

## 2023-08-04 VITALS
BODY MASS INDEX: 49.79 KG/M2 | HEIGHT: 63 IN | SYSTOLIC BLOOD PRESSURE: 148 MMHG | DIASTOLIC BLOOD PRESSURE: 68 MMHG | WEIGHT: 281 LBS

## 2023-08-04 DIAGNOSIS — I63.232 CEREBROVASCULAR ACCIDENT (CVA) DUE TO OCCLUSION OF LEFT CAROTID ARTERY (HCC): ICD-10-CM

## 2023-08-04 DIAGNOSIS — E78.2 MIXED HYPERLIPIDEMIA: ICD-10-CM

## 2023-08-04 DIAGNOSIS — Z72.0 TOBACCO USE: ICD-10-CM

## 2023-08-04 DIAGNOSIS — I65.01 VERTEBRAL ARTERY OCCLUSION, RIGHT: Primary | ICD-10-CM

## 2023-08-04 DIAGNOSIS — I10 ESSENTIAL HYPERTENSION: ICD-10-CM

## 2023-08-04 DIAGNOSIS — I70.213 INTERMITTENT CLAUDICATION OF BOTH LOWER EXTREMITIES DUE TO ATHEROSCLEROSIS (HCC): ICD-10-CM

## 2023-08-04 DIAGNOSIS — I65.22 OCCLUSION OF LEFT CAROTID ARTERY: Primary | ICD-10-CM

## 2023-08-04 DIAGNOSIS — M79.89 LEG SWELLING: ICD-10-CM

## 2023-08-04 DIAGNOSIS — I10 HYPERTENSION, ESSENTIAL: ICD-10-CM

## 2023-08-04 DIAGNOSIS — I67.5 MOYAMOYA DISEASE: ICD-10-CM

## 2023-08-04 PROCEDURE — 99214 OFFICE O/P EST MOD 30 MIN: CPT | Performed by: SURGERY

## 2023-08-04 PROCEDURE — G8417 CALC BMI ABV UP PARAM F/U: HCPCS | Performed by: SURGERY

## 2023-08-04 PROCEDURE — 3078F DIAST BP <80 MM HG: CPT | Performed by: SURGERY

## 2023-08-04 PROCEDURE — G8428 CUR MEDS NOT DOCUMENT: HCPCS | Performed by: SURGERY

## 2023-08-04 PROCEDURE — 3077F SYST BP >= 140 MM HG: CPT | Performed by: SURGERY

## 2023-08-04 PROCEDURE — 4004F PT TOBACCO SCREEN RCVD TLK: CPT | Performed by: SURGERY

## 2023-08-04 RX ORDER — DULAGLUTIDE 1.5 MG/.5ML
INJECTION, SOLUTION SUBCUTANEOUS
COMMUNITY
Start: 2023-07-14

## 2023-08-04 RX ORDER — PROPRANOLOL HYDROCHLORIDE 20 MG/1
TABLET ORAL
COMMUNITY
Start: 2023-07-14

## 2023-08-04 ASSESSMENT — ENCOUNTER SYMPTOMS
GASTROINTESTINAL NEGATIVE: 1
ALLERGIC/IMMUNOLOGIC NEGATIVE: 1
RESPIRATORY NEGATIVE: 1
BACK PAIN: 1

## 2023-08-04 NOTE — PROGRESS NOTES
Insecurity: Not on file   Transportation Needs: Not on file   Physical Activity: Not on file   Stress: Not on file   Social Connections: Not on file   Intimate Partner Violence: Not on file   Housing Stability: Not on file       Family History   Problem Relation Age of Onset    Arthritis Mother     High Blood Pressure Mother     High Cholesterol Mother     Hypertension Mother     COPD Mother     Cancer Maternal Uncle     Diabetes Paternal Aunt     Diabetes Paternal Uncle     Cancer Maternal Grandmother     Cancer Maternal Grandfather     Hypertension Father     Hypertension Brother          Current Outpatient Medications:     TRULICITY 1.5 GE/8.1LK SC injection, , Disp: , Rfl:     propranolol (INDERAL) 20 MG tablet, , Disp: , Rfl:     buPROPion (WELLBUTRIN XL) 150 MG extended release tablet, , Disp: , Rfl:     amLODIPine (NORVASC) 5 MG tablet, , Disp: , Rfl:     metFORMIN (GLUCOPHAGE) 500 MG tablet, , Disp: , Rfl:     losartan (COZAAR) 100 MG tablet, , Disp: , Rfl:     vitamin D (ERGOCALCIFEROL) 1.25 MG (33734 UT) CAPS capsule, Take 1 capsule by mouth once a week, Disp: 8 capsule, Rfl: 0    aspirin (PAMELA ASPIRIN) 325 MG tablet, Take 1 tablet by mouth daily, Disp: 30 tablet, Rfl: 3    atorvastatin (LIPITOR) 80 MG tablet, Take 1 tablet by mouth daily, Disp: , Rfl:     busPIRone (BUSPAR) 15 MG tablet, Take 15 mg by mouth nightly, Disp: , Rfl:     vitamin B-12 (CYANOCOBALAMIN) 1000 MCG tablet, , Disp: , Rfl:     amLODIPine-atorvastatatin (CADUET) 5-10 MG per tablet, Take 1 tablet by mouth daily (Patient not taking: Reported on 8/4/2023), Disp: , Rfl:     naltrexone-buPROPion (CONTRAVE) 8-90 MG per extended release tablet, Start:1 tab po qam, then 1 tab po bid x 1 wk, then 2 tabs po qam and 1 tab po qpm x 1 wk;  Max 4 tabs/day, Disp: 120 tablet, Rfl: 0    folic acid (FOLVITE) 1 MG tablet, Take 1 tablet by mouth daily (Patient not taking: Reported on 8/4/2023), Disp: 30 tablet, Rfl: 3    losartan (COZAAR) 50 MG tablet,

## 2023-08-04 NOTE — PATIENT INSTRUCTIONS
Schedule to return in six months for a bilateral carotid duplex scan and office visit. Signs/Symptoms of Stroke:  - Watch for one eye going dark like a shade was pulled down over it. - Watch for one side of the body or face not working.  - Difficulty with speech such as slurring your words  - Difficulty finding the right words, i.e. Calling an object by the wrong name when you know the real name. If you have any of these symptoms, do not call us, or your family doctor. Call 911, and go immediately to the hospital. You have a 4-6 hour window from the point when symptoms start to get to the hospital, get a CT scan done, and initiate clot dissolving drugs.

## 2024-02-09 ENCOUNTER — PROCEDURE VISIT (OUTPATIENT)
Dept: SURGERY | Age: 44
End: 2024-02-09
Payer: COMMERCIAL

## 2024-02-09 DIAGNOSIS — I63.232 CEREBROVASCULAR ACCIDENT (CVA) DUE TO OCCLUSION OF LEFT CAROTID ARTERY (HCC): ICD-10-CM

## 2024-02-09 DIAGNOSIS — I65.22 OCCLUSION OF LEFT CAROTID ARTERY: Primary | ICD-10-CM

## 2024-02-09 PROCEDURE — 93880 EXTRACRANIAL BILAT STUDY: CPT | Performed by: SURGERY

## 2024-02-29 ENCOUNTER — APPOINTMENT (OUTPATIENT)
Dept: GENERAL RADIOLOGY | Age: 44
End: 2024-02-29
Payer: COMMERCIAL

## 2024-02-29 ENCOUNTER — APPOINTMENT (OUTPATIENT)
Dept: CT IMAGING | Age: 44
End: 2024-02-29
Payer: COMMERCIAL

## 2024-02-29 ENCOUNTER — HOSPITAL ENCOUNTER (OUTPATIENT)
Age: 44
Setting detail: OBSERVATION
Discharge: LEFT BEFORE DISCHARGE | End: 2024-02-29
Attending: EMERGENCY MEDICINE | Admitting: INTERNAL MEDICINE
Payer: COMMERCIAL

## 2024-02-29 VITALS
WEIGHT: 250 LBS | HEART RATE: 89 BPM | SYSTOLIC BLOOD PRESSURE: 162 MMHG | OXYGEN SATURATION: 96 % | BODY MASS INDEX: 42.68 KG/M2 | TEMPERATURE: 97.8 F | HEIGHT: 64 IN | RESPIRATION RATE: 20 BRPM | DIASTOLIC BLOOD PRESSURE: 74 MMHG

## 2024-02-29 DIAGNOSIS — G45.9 TIA (TRANSIENT ISCHEMIC ATTACK): Primary | ICD-10-CM

## 2024-02-29 DIAGNOSIS — I67.9 CEREBRAL VASCULAR DISEASE: ICD-10-CM

## 2024-02-29 PROBLEM — R29.90 STROKE-LIKE SYMPTOMS: Status: ACTIVE | Noted: 2024-02-29

## 2024-02-29 LAB
ALBUMIN SERPL-MCNC: 4.2 G/DL (ref 3.4–5)
ALBUMIN/GLOB SERPL: 1.1 {RATIO} (ref 1.1–2.2)
ALP SERPL-CCNC: 120 U/L (ref 40–129)
ALT SERPL-CCNC: 11 U/L (ref 10–40)
ANION GAP SERPL CALCULATED.3IONS-SCNC: 13 MMOL/L (ref 3–16)
AST SERPL-CCNC: 12 U/L (ref 15–37)
BASOPHILS # BLD: 0 K/UL (ref 0–0.2)
BASOPHILS NFR BLD: 0.2 %
BILIRUB SERPL-MCNC: 0.4 MG/DL (ref 0–1)
BUN SERPL-MCNC: 12 MG/DL (ref 7–20)
CALCIUM SERPL-MCNC: 9.8 MG/DL (ref 8.3–10.6)
CHLORIDE SERPL-SCNC: 100 MMOL/L (ref 99–110)
CO2 SERPL-SCNC: 24 MMOL/L (ref 21–32)
CREAT SERPL-MCNC: 0.8 MG/DL (ref 0.6–1.1)
DEPRECATED RDW RBC AUTO: 16.8 % (ref 12.4–15.4)
EOSINOPHIL # BLD: 0.3 K/UL (ref 0–0.6)
EOSINOPHIL NFR BLD: 2.9 %
GFR SERPLBLD CREATININE-BSD FMLA CKD-EPI: >60 ML/MIN/{1.73_M2}
GLUCOSE BLD-MCNC: 102 MG/DL (ref 70–99)
GLUCOSE SERPL-MCNC: 107 MG/DL (ref 70–99)
HCT VFR BLD AUTO: 43.1 % (ref 36–48)
HGB BLD-MCNC: 13.2 G/DL (ref 12–16)
IMM GRANULOCYTES # BLD: 0 K/UL (ref 0–0.2)
IMM GRANULOCYTES NFR BLD: 0.2 %
INR PPP: 0.98 (ref 0.84–1.16)
LYMPHOCYTES # BLD: 3.2 K/UL (ref 1–5.1)
LYMPHOCYTES NFR BLD: 32.8 %
MCH RBC QN AUTO: 26.9 PG (ref 26–34)
MCHC RBC AUTO-ENTMCNC: 30.6 G/DL (ref 32–36.4)
MCV RBC AUTO: 88 FL (ref 80–100)
MONOCYTES # BLD: 0.5 K/UL (ref 0–1.3)
MONOCYTES NFR BLD: 5.1 %
NEUTROPHILS # BLD: 5.7 K/UL (ref 1.7–7.7)
NEUTROPHILS NFR BLD: 58.8 %
PERFORMED ON: ABNORMAL
PLATELET # BLD AUTO: 356 K/UL (ref 135–450)
PMV BLD AUTO: 10.3 FL (ref 5–10.5)
POTASSIUM SERPL-SCNC: 3.6 MMOL/L (ref 3.5–5.1)
PROT SERPL-MCNC: 8.1 G/DL (ref 6.4–8.2)
PROTHROMBIN TIME: 13 SEC (ref 11.5–14.8)
RBC # BLD AUTO: 4.9 M/UL (ref 4–5.2)
SODIUM SERPL-SCNC: 137 MMOL/L (ref 136–145)
TROPONIN, HIGH SENSITIVITY: <6 NG/L (ref 0–14)
WBC # BLD AUTO: 9.6 K/UL (ref 4–11)

## 2024-02-29 PROCEDURE — 6370000000 HC RX 637 (ALT 250 FOR IP): Performed by: EMERGENCY MEDICINE

## 2024-02-29 PROCEDURE — 80053 COMPREHEN METABOLIC PANEL: CPT

## 2024-02-29 PROCEDURE — 84484 ASSAY OF TROPONIN QUANT: CPT

## 2024-02-29 PROCEDURE — 93005 ELECTROCARDIOGRAM TRACING: CPT | Performed by: EMERGENCY MEDICINE

## 2024-02-29 PROCEDURE — 70450 CT HEAD/BRAIN W/O DYE: CPT

## 2024-02-29 PROCEDURE — 85025 COMPLETE CBC W/AUTO DIFF WBC: CPT

## 2024-02-29 PROCEDURE — 6360000004 HC RX CONTRAST MEDICATION: Performed by: EMERGENCY MEDICINE

## 2024-02-29 PROCEDURE — 70498 CT ANGIOGRAPHY NECK: CPT

## 2024-02-29 PROCEDURE — G0378 HOSPITAL OBSERVATION PER HR: HCPCS

## 2024-02-29 PROCEDURE — 36415 COLL VENOUS BLD VENIPUNCTURE: CPT

## 2024-02-29 PROCEDURE — 71045 X-RAY EXAM CHEST 1 VIEW: CPT

## 2024-02-29 PROCEDURE — 2580000003 HC RX 258: Performed by: EMERGENCY MEDICINE

## 2024-02-29 PROCEDURE — 82947 ASSAY GLUCOSE BLOOD QUANT: CPT

## 2024-02-29 PROCEDURE — 99285 EMERGENCY DEPT VISIT HI MDM: CPT

## 2024-02-29 PROCEDURE — 85610 PROTHROMBIN TIME: CPT

## 2024-02-29 RX ORDER — ENOXAPARIN SODIUM 100 MG/ML
30 INJECTION SUBCUTANEOUS 2 TIMES DAILY
Status: CANCELLED | OUTPATIENT
Start: 2024-02-29

## 2024-02-29 RX ORDER — NICOTINE 21 MG/24HR
1 PATCH, TRANSDERMAL 24 HOURS TRANSDERMAL ONCE
Status: DISCONTINUED | OUTPATIENT
Start: 2024-02-29 | End: 2024-03-01 | Stop reason: HOSPADM

## 2024-02-29 RX ORDER — SODIUM CHLORIDE 0.9 % (FLUSH) 0.9 %
5-40 SYRINGE (ML) INJECTION PRN
Status: CANCELLED | OUTPATIENT
Start: 2024-02-29

## 2024-02-29 RX ORDER — ASPIRIN 81 MG/1
81 TABLET, CHEWABLE ORAL DAILY
Status: CANCELLED | OUTPATIENT
Start: 2024-03-01

## 2024-02-29 RX ORDER — ONDANSETRON 2 MG/ML
4 INJECTION INTRAMUSCULAR; INTRAVENOUS EVERY 6 HOURS PRN
Status: CANCELLED | OUTPATIENT
Start: 2024-02-29

## 2024-02-29 RX ORDER — ONDANSETRON 4 MG/1
4 TABLET, ORALLY DISINTEGRATING ORAL EVERY 8 HOURS PRN
Status: CANCELLED | OUTPATIENT
Start: 2024-02-29

## 2024-02-29 RX ORDER — LABETALOL HYDROCHLORIDE 5 MG/ML
10 INJECTION, SOLUTION INTRAVENOUS EVERY 10 MIN PRN
Status: CANCELLED | OUTPATIENT
Start: 2024-02-29

## 2024-02-29 RX ORDER — SODIUM CHLORIDE 0.9 % (FLUSH) 0.9 %
5-40 SYRINGE (ML) INJECTION EVERY 12 HOURS SCHEDULED
Status: CANCELLED | OUTPATIENT
Start: 2024-02-29

## 2024-02-29 RX ORDER — ATORVASTATIN CALCIUM 80 MG/1
80 TABLET, FILM COATED ORAL NIGHTLY
Status: CANCELLED | OUTPATIENT
Start: 2024-02-29

## 2024-02-29 RX ORDER — 0.9 % SODIUM CHLORIDE 0.9 %
1000 INTRAVENOUS SOLUTION INTRAVENOUS ONCE
Status: COMPLETED | OUTPATIENT
Start: 2024-02-29 | End: 2024-02-29

## 2024-02-29 RX ORDER — ASPIRIN 300 MG/1
300 SUPPOSITORY RECTAL DAILY
Status: CANCELLED | OUTPATIENT
Start: 2024-03-01

## 2024-02-29 RX ORDER — SODIUM CHLORIDE 9 MG/ML
INJECTION, SOLUTION INTRAVENOUS PRN
Status: CANCELLED | OUTPATIENT
Start: 2024-02-29

## 2024-02-29 RX ORDER — POLYETHYLENE GLYCOL 3350 17 G/17G
17 POWDER, FOR SOLUTION ORAL DAILY PRN
Status: CANCELLED | OUTPATIENT
Start: 2024-02-29

## 2024-02-29 RX ORDER — ASPIRIN 325 MG
325 TABLET, DELAYED RELEASE (ENTERIC COATED) ORAL ONCE
Status: COMPLETED | OUTPATIENT
Start: 2024-02-29 | End: 2024-02-29

## 2024-02-29 RX ADMIN — SODIUM CHLORIDE 1000 ML: 9 INJECTION, SOLUTION INTRAVENOUS at 18:35

## 2024-02-29 RX ADMIN — ASPIRIN 325 MG: 325 TABLET, COATED ORAL at 20:58

## 2024-02-29 ASSESSMENT — PAIN DESCRIPTION - FREQUENCY: FREQUENCY: CONTINUOUS

## 2024-02-29 ASSESSMENT — PAIN DESCRIPTION - DESCRIPTORS: DESCRIPTORS: DULL

## 2024-02-29 ASSESSMENT — PAIN - FUNCTIONAL ASSESSMENT: PAIN_FUNCTIONAL_ASSESSMENT: 0-10

## 2024-02-29 ASSESSMENT — PAIN DESCRIPTION - PAIN TYPE: TYPE: ACUTE PAIN

## 2024-02-29 ASSESSMENT — PAIN DESCRIPTION - LOCATION: LOCATION: HEAD

## 2024-02-29 ASSESSMENT — LIFESTYLE VARIABLES
HOW OFTEN DO YOU HAVE A DRINK CONTAINING ALCOHOL: NEVER
HOW MANY STANDARD DRINKS CONTAINING ALCOHOL DO YOU HAVE ON A TYPICAL DAY: PATIENT DOES NOT DRINK

## 2024-02-29 ASSESSMENT — PAIN DESCRIPTION - ORIENTATION: ORIENTATION: ANTERIOR

## 2024-02-29 ASSESSMENT — PAIN SCALES - GENERAL: PAINLEVEL_OUTOF10: 5

## 2024-02-29 NOTE — ED TRIAGE NOTES
Pt arrives ambulatory for eval of sudden onset headache and  blurred vision when standing at work, felt lightheaded and saw stars, all now resolved other than headache, onset 1740 sts left carotid artery is 100% blocked, hx of cva in 2015. Pt is a/ox4, resp nonlabored and skin race appropriate, warm and dry.

## 2024-03-01 LAB
EKG ATRIAL RATE: 82 BPM
EKG DIAGNOSIS: NORMAL
EKG P AXIS: 30 DEGREES
EKG P-R INTERVAL: 164 MS
EKG Q-T INTERVAL: 410 MS
EKG QRS DURATION: 66 MS
EKG QTC CALCULATION (BAZETT): 479 MS
EKG R AXIS: 8 DEGREES
EKG T AXIS: 32 DEGREES
EKG VENTRICULAR RATE: 82 BPM

## 2024-03-01 PROCEDURE — 93010 ELECTROCARDIOGRAM REPORT: CPT | Performed by: INTERNAL MEDICINE

## 2024-03-01 NOTE — ED NOTES
Patient reports to this RN that she is considering leaving AMA, states she can sleep in her own bed and follow up with her \"specialist\" in the morning.  Dr. Sandhu, ED attending updated.  No additional orders received.  AMA form brought to bedside, updated that bed is ready, transportation just needs to be arranged.  Patient debates with self, asks for boyfriend's opinion, reviews symptoms and that she will notify 911 if symptoms of concern return, decides to leave AMA, will follow up in the morning with her \"PCP and specialist.\"   Boyfriend at bedside confirms he will make sure patient follows up in the morning.  AMA paperwork signed by patient.

## 2024-03-01 NOTE — ED NOTES
Patient asking to go outside to smoke or for nicotine patch.  Dr. Sandhu updated w/ request.  New order received for nicotine patch.

## 2024-03-01 NOTE — ED PROVIDER NOTES
[facility] Emergency Department      Pt Name: Mouna Murillo  MRN: [id number]  Birthdate 1980  Date of evaluation: 2/29/2024  Provider: CONRADO MARTIN MD  CHIEF COMPLAINT  Chief Complaint   Patient presents with    Headache     Pt arrives ambulatory for eval of sudden onset headache and  blurred vision when standing at work, felt lightheaded and saw stars, all now resolved other than headache, onset 1740 sts left carotid artery is 100% blocked, hx of cva in 2015     HPI  Mouna Murillo is a 43 y.o. female who presents because of concern for a stroke.  She is history of prior stroke in 2015.  At that time, she was diagnosed with a 100% occlusion of her left carotid artery.  She has been on aspirin since then.  She had some neurologic symptoms in 2021 also.  She does not have any chronic deficits.  Her prior symptoms in 2015 consisted of visual changes and generalized weakness.  She developed a headache while at work.  She started having blurring of her vision.  She got concerned she was having another stroke so she immediately came to the hospital within 20 minutes of onset of symptoms.  The headache has started to resolve.  Her vision is returning to normal.  She denies any numbness, tingling, or focal weakness of her extremities    REVIEW OF SYSTEMS:  No fever, no chest pain, no abdominal pain Pertinent positives and negatives as per the HPI.  All other pertinent review of systems reviewed and negative.  Nursing notes reviewed.    PAST MEDICAL HISTORY  Past Medical History:   Diagnosis Date    Anxiety     Carotid stenosis 12/2/2015    Cerebral artery occlusion with cerebral infarction (HCC)     Depression     Graves disease     Hyperlipidemia     Hyperlipidemia     Hypertension     Hypertension, essential     Intermittent claudication of both lower extremities due to atherosclerosis (HCC) 7/10/2020    Morbid obesity with BMI of 50.0-59.9, adult (HCC)     Stroke (HCC)      SURGICAL HISTORY  Past Surgical  for 15.0 years (15.0 ttl pk-yrs)     Types: Cigarettes    Smokeless tobacco: Never    Tobacco comments:     Quit smoking 7 months ago, but started up again   Substance Use Topics    Alcohol use: No    Drug use: No     IMMUNIZATIONS:  Noncontributory    PHYSICAL EXAM  VITAL SIGNS:  BP (!) 147/68   Pulse 89   Temp 97.8 °F (36.6 °C) (Oral)   Resp 18   Ht 1.626 m (5' 4\")   Wt 113.4 kg (250 lb)   SpO2 98%   BMI 42.91 kg/m²   Constitutional:  43 y.o. female seated  HENT:  Atraumatic, mucous membranes moist  Eyes:   Conjunctiva clear, no icterus  Neck:  Supple, no adenopathy, no visible JVD  Cardiovascular:  Regular, no rubs  Thorax & Lungs:  No accessory muscle usage, clear  Abdomen:  Soft, non distended, bowel sounds present, no tenderness  Back:  No deformity  Genitalia:  Deferred  Rectal:  Deferred  Extremities:  No cyanosis, no edema  Skin:  Exposed areas of skin warm, dry  Neurologic:  Alert & oriented, GCS 15, no slurred speech, CN function intact, PERRL, NIHSS 0  Psychiatric:  Affect appropriate    NIH Stroke Scale  Interval: Baseline  Level of Consciousness (1a): Alert  LOC Questions (1b): Answers both correctly  LOC Commands (1c): Performs both tasks correctly  Best Gaze (2): Normal  Visual (3): No visual loss  Facial Palsy (4): Normal symmetrical movement  Motor Arm, Left (5a): No drift  Motor Arm, Right (5b): No drift  Motor Leg, Left (6a): No drift  Motor Leg, Right (6b): No drift  Limb Ataxia (7): Absent  Sensory (8): Normal  Best Language (9): No aphasia  Dysarthria (10): Normal  Extinction and Inattention (11): No abnormality  Total: 0    RESULTS / MEDICAL DECISION MAKING:  Labs resulted at the time of this note reviewed.  Labs Reviewed   CBC WITH AUTO DIFFERENTIAL - Abnormal; Notable for the following components:       Result Value    MCHC 30.6 (*)     RDW 16.8 (*)     All other components within normal limits   COMPREHENSIVE METABOLIC PANEL W/ REFLEX TO MG FOR LOW K - Abnormal; Notable for the

## 2024-03-01 NOTE — ED NOTES
Patient updated on bed status (dirty, needs to be cleaned).  Once room is cleaned, transportation will be arranged.  She verbalized understanding, is agreeable with plan.

## 2024-03-01 NOTE — ED NOTES
Patient given discharge/AMA packet, reviewed with her and boyfriend.  All questions answered to their satisfaction.  Both verbalized understanding.  Patient's IV is removed, catheter intact, pressure dressing placed, no drainage noted.  Patient ambulates from room with all belongings, gait is steady and even, declines wheelchair, in no apparent distress at time of discharge.

## 2024-03-01 NOTE — ED NOTES
Patient updated room is being cleaned at this time, transport will be arranged once finished/room ready.  She verbalized understanding.

## 2024-03-01 NOTE — ED NOTES
Patient requesting food.  Dr. Wilson updated.  Patient ok to eat if passes swallow screen.  Swallow screen performed.  Patient able to drink 3 oz of water with no s/s of aspiration.

## 2024-03-01 NOTE — ED PROVIDER NOTES
[place] of Care Note:    I assumed care of this patient at 1900 from Dr. Wilson, please see Steve note for more detail. Briefly, this pt is a 43-year-old female who was going to be admitted for complaints of headache and TIA.  Before the patient could be transferred to [facility] for inpatient care, she decided that she did not want to be hospitalized or stay in the hospital and wanted to go home to follow-up with her primary care physician.  I did explain to the patient that risks of leaving the hospital AMA included stroke, recurrent TIA, death.      FINAL IMPRESSION      1. TIA (transient ischemic attack)    2. Cerebral vascular disease              Cali Sandhu MD  02/29/24 4618

## 2024-07-30 ENCOUNTER — TELEPHONE (OUTPATIENT)
Dept: VASCULAR SURGERY | Age: 44
End: 2024-07-30

## 2024-07-30 DIAGNOSIS — I65.22 OCCLUSION OF LEFT CAROTID ARTERY: Primary | ICD-10-CM

## 2024-07-30 DIAGNOSIS — I65.01 VERTEBRAL ARTERY OCCLUSION, RIGHT: ICD-10-CM

## 2024-07-30 NOTE — TELEPHONE ENCOUNTER
PT is due to come in office for scan and office visit.  There are no orders in epic for scan.  Please call and schedule appt with Dr Bui once the scan orders are placed.

## 2024-09-05 ENCOUNTER — OFFICE VISIT (OUTPATIENT)
Dept: VASCULAR SURGERY | Age: 44
End: 2024-09-05
Payer: COMMERCIAL

## 2024-09-05 VITALS
SYSTOLIC BLOOD PRESSURE: 80 MMHG | WEIGHT: 229 LBS | HEIGHT: 64 IN | BODY MASS INDEX: 39.09 KG/M2 | DIASTOLIC BLOOD PRESSURE: 80 MMHG

## 2024-09-05 DIAGNOSIS — I67.5 MOYAMOYA DISEASE: ICD-10-CM

## 2024-09-05 DIAGNOSIS — I65.8: ICD-10-CM

## 2024-09-05 DIAGNOSIS — I10 ESSENTIAL HYPERTENSION: ICD-10-CM

## 2024-09-05 DIAGNOSIS — I65.23 BILATERAL CAROTID ARTERY STENOSIS: Primary | ICD-10-CM

## 2024-09-05 DIAGNOSIS — I65.01 VERTEBRAL ARTERY OCCLUSION, RIGHT: ICD-10-CM

## 2024-09-05 DIAGNOSIS — I65.22 OCCLUSION OF LEFT CAROTID ARTERY: ICD-10-CM

## 2024-09-05 PROCEDURE — G8417 CALC BMI ABV UP PARAM F/U: HCPCS | Performed by: SURGERY

## 2024-09-05 PROCEDURE — 4004F PT TOBACCO SCREEN RCVD TLK: CPT | Performed by: SURGERY

## 2024-09-05 PROCEDURE — 3078F DIAST BP <80 MM HG: CPT | Performed by: SURGERY

## 2024-09-05 PROCEDURE — 3074F SYST BP LT 130 MM HG: CPT | Performed by: SURGERY

## 2024-09-05 PROCEDURE — G8427 DOCREV CUR MEDS BY ELIG CLIN: HCPCS | Performed by: SURGERY

## 2024-09-05 PROCEDURE — 99214 OFFICE O/P EST MOD 30 MIN: CPT | Performed by: SURGERY

## 2024-09-05 ASSESSMENT — ENCOUNTER SYMPTOMS
GASTROINTESTINAL NEGATIVE: 1
ALLERGIC/IMMUNOLOGIC NEGATIVE: 1
BACK PAIN: 1
RESPIRATORY NEGATIVE: 1

## 2024-09-05 NOTE — PROGRESS NOTES
Left CCA dist EDV 09/05/2024 5.1  cm/s In process    Left CCA mid PSV 09/05/2024 62.20  cm/s In process    Left CCA mid EDV 09/05/2024 6.20  cm/s In process    Left CCA prox PSV 09/05/2024 62.2  cm/s In process    Left CCA prox EDV 09/05/2024 5.1  cm/s In process    Left ECA PSV 09/05/2024 185.7  cm/s In process    Left ECA EDV 09/05/2024 12.40  cm/s In process    Left ICA dist PSV 09/05/2024 0.0  cm/s In process    Left ICA dist EDV 09/05/2024 0.0  cm/s In process    Left ICA mid PSV 09/05/2024 0.0  cm/s In process    Left ICA mid EDV 09/05/2024 0.0  cm/s In process    Left ICA prox PSV 09/05/2024 0.0  cm/s In process    Left ICA prox EDV 09/05/2024 0.0  cm/s In process    Left vertebral PSV 09/05/2024 94.9  cm/s In process    Left vertebral EDV 09/05/2024 34.00  cm/s In process    Left ICA/CCA PSV 09/05/2024 0.00  no units In process    Right arm BP 09/05/2024 176  mmHg In process    Left arm BP 09/05/2024 180  mmHg In process       Review of Systems   HENT: Negative.     Eyes:  Positive for visual disturbance (wears glasses).   Respiratory: Negative.     Cardiovascular:  Positive for leg swelling.        Leg pain with walking   Gastrointestinal: Negative.    Genitourinary: Negative.    Musculoskeletal:  Positive for back pain.   Allergic/Immunologic: Negative.    Hematological: Negative.    Psychiatric/Behavioral: Negative.     All other systems reviewed and are negative.      Physical Exam  Vitals and nursing note reviewed.   Constitutional:       Appearance: Normal appearance. She is well-developed.      Comments: Morbid obesity   HENT:      Head: Normocephalic.      Right Ear: Hearing and external ear normal.      Left Ear: Hearing and external ear normal.   Neck:      Vascular: No carotid bruit.   Cardiovascular:      Rate and Rhythm: Normal rate and regular rhythm.      Pulses:           Carotid pulses are 2+ on the right side and 2+ on the left side.       Radial pulses are 2+ on the right side and 2+ on

## 2025-03-07 ENCOUNTER — OFFICE VISIT (OUTPATIENT)
Dept: VASCULAR SURGERY | Age: 45
End: 2025-03-07

## 2025-03-07 VITALS — BODY MASS INDEX: 39.48 KG/M2 | SYSTOLIC BLOOD PRESSURE: 158 MMHG | DIASTOLIC BLOOD PRESSURE: 80 MMHG | WEIGHT: 230 LBS

## 2025-03-07 DIAGNOSIS — I10 ESSENTIAL HYPERTENSION: ICD-10-CM

## 2025-03-07 DIAGNOSIS — I63.232 CEREBROVASCULAR ACCIDENT (CVA) DUE TO OCCLUSION OF LEFT CAROTID ARTERY (HCC): Primary | ICD-10-CM

## 2025-03-07 DIAGNOSIS — I66.9 ASYMPTOMATIC STENOSIS OF INTRACRANIAL ARTERY: ICD-10-CM

## 2025-03-07 DIAGNOSIS — I65.22 OCCLUSION OF LEFT CAROTID ARTERY: ICD-10-CM

## 2025-03-07 DIAGNOSIS — I67.5 MOYAMOYA DISEASE: ICD-10-CM

## 2025-03-07 ASSESSMENT — ENCOUNTER SYMPTOMS
GASTROINTESTINAL NEGATIVE: 1
BACK PAIN: 1
RESPIRATORY NEGATIVE: 1
ALLERGIC/IMMUNOLOGIC NEGATIVE: 1

## 2025-03-07 NOTE — PROGRESS NOTES
Daily Progress Note   Ja Bui MD      3/7/2025    Chief Complaint   Patient presents with    Follow-up     Return in about 6 months (around 3/5/2025) for bilateral CDS and office visit -             HISTORY OF PRESENT ILLNESS:                The patient is a 44 y.o. female who presents with a six month follow up for a carotid duplex scan.    Today Mouna says she has been doing well.  She has lost about 75 pounds in the last eight months, and is more active.  She is also working now.      Mouna's scan today shows no change in her carotids.  There is a total occlusion of the left side, and 0-15% of the right. She reports no stroke or TIA symptoms.     Past Medical History:   Diagnosis Date    Anxiety     Carotid stenosis 12/2/2015    Cerebral artery occlusion with cerebral infarction (HCC)     Depression     Graves disease     Hyperlipidemia     Hyperlipidemia     Hypertension     Hypertension, essential     Intermittent claudication of both lower extremities due to atherosclerosis 7/10/2020    Morbid obesity with BMI of 50.0-59.9, adult     Stroke (HCC)        Past Surgical History:   Procedure Laterality Date    DENTAL SURGERY      TONSILLECTOMY         Social History     Socioeconomic History    Marital status:      Spouse name: Not on file    Number of children: Not on file    Years of education: Not on file    Highest education level: Not on file   Occupational History    Not on file   Tobacco Use    Smoking status: Every Day     Current packs/day: 1.00     Average packs/day: 1 pack/day for 15.0 years (15.0 ttl pk-yrs)     Types: Cigarettes    Smokeless tobacco: Never    Tobacco comments:     Quit smoking 7 months ago, but started up again   Substance and Sexual Activity    Alcohol use: No    Drug use: No    Sexual activity: Not on file   Other Topics Concern    Not on file   Social History Narrative    Not on file     Social Determinants of Health     Financial Resource Strain: Not on File (8/23/2019)